# Patient Record
Sex: MALE | Race: WHITE | NOT HISPANIC OR LATINO
[De-identification: names, ages, dates, MRNs, and addresses within clinical notes are randomized per-mention and may not be internally consistent; named-entity substitution may affect disease eponyms.]

---

## 2018-01-08 ENCOUNTER — APPOINTMENT (OUTPATIENT)
Dept: FAMILY MEDICINE | Facility: CLINIC | Age: 78
End: 2018-01-08
Payer: MEDICARE

## 2018-01-08 ENCOUNTER — OTHER (OUTPATIENT)
Age: 78
End: 2018-01-08

## 2018-01-08 ENCOUNTER — APPOINTMENT (OUTPATIENT)
Dept: FAMILY MEDICINE | Facility: CLINIC | Age: 78
End: 2018-01-08

## 2018-01-08 VITALS
OXYGEN SATURATION: 95 % | BODY MASS INDEX: 28.49 KG/M2 | HEART RATE: 87 BPM | TEMPERATURE: 97.5 F | WEIGHT: 188 LBS | SYSTOLIC BLOOD PRESSURE: 140 MMHG | DIASTOLIC BLOOD PRESSURE: 70 MMHG | HEIGHT: 68 IN

## 2018-01-08 PROCEDURE — 99214 OFFICE O/P EST MOD 30 MIN: CPT

## 2018-03-02 LAB — LDLC SERPL CALC-MCNC: 102

## 2018-05-14 ENCOUNTER — APPOINTMENT (OUTPATIENT)
Dept: FAMILY MEDICINE | Facility: CLINIC | Age: 78
End: 2018-05-14
Payer: MEDICARE

## 2018-05-14 VITALS
HEIGHT: 68 IN | DIASTOLIC BLOOD PRESSURE: 70 MMHG | SYSTOLIC BLOOD PRESSURE: 110 MMHG | WEIGHT: 192 LBS | HEART RATE: 72 BPM | BODY MASS INDEX: 29.1 KG/M2

## 2018-05-14 DIAGNOSIS — J30.9 ALLERGIC RHINITIS, UNSPECIFIED: ICD-10-CM

## 2018-05-14 DIAGNOSIS — J06.9 ACUTE UPPER RESPIRATORY INFECTION, UNSPECIFIED: ICD-10-CM

## 2018-05-14 PROCEDURE — 99213 OFFICE O/P EST LOW 20 MIN: CPT

## 2018-06-28 ENCOUNTER — APPOINTMENT (OUTPATIENT)
Dept: FAMILY MEDICINE | Facility: CLINIC | Age: 78
End: 2018-06-28
Payer: MEDICARE

## 2018-06-28 VITALS
DIASTOLIC BLOOD PRESSURE: 50 MMHG | HEIGHT: 68 IN | BODY MASS INDEX: 28.34 KG/M2 | HEART RATE: 71 BPM | WEIGHT: 187 LBS | SYSTOLIC BLOOD PRESSURE: 90 MMHG

## 2018-06-28 VITALS — DIASTOLIC BLOOD PRESSURE: 50 MMHG | SYSTOLIC BLOOD PRESSURE: 96 MMHG

## 2018-06-28 PROCEDURE — 36415 COLL VENOUS BLD VENIPUNCTURE: CPT | Mod: 59

## 2018-06-28 PROCEDURE — G0439: CPT

## 2018-06-28 NOTE — HEALTH RISK ASSESSMENT
[Fair] : ~his/her~ current health as fair  [Good] : ~his/her~  mood as  good [No falls in past year] : Patient reported no falls in the past year [0] : 2) Feeling down, depressed, or hopeless: Not at all (0) [Patient reported colonoscopy was normal] : Patient reported colonoscopy was normal [HIV test declined] : HIV test declined [Hepatitis C test declined] : Hepatitis C test declined [None] : None [With Significant Other] : lives with significant other [Retired] : retired [High School] : high school [] :  [# Of Children ___] : has [unfilled] children [Feels Safe at Home] : Feels safe at home [Fully functional (bathing, dressing, toileting, transferring, walking, feeding)] : Fully functional (bathing, dressing, toileting, transferring, walking, feeding) [Fully functional (using the telephone, shopping, preparing meals, housekeeping, doing laundry, using] : Fully functional and needs no help or supervision to perform IADLs (using the telephone, shopping, preparing meals, housekeeping, doing laundry, using transportation, managing medications and managing finances) [Reports changes in hearing] : Reports changes in hearing [Reports changes in vision] : Reports changes in vision [Smoke Detector] : smoke detector [Carbon Monoxide Detector] : carbon monoxide detector [Safety elements used in home] : safety elements used in home [Seat Belt] :  uses seat belt [Discussed at today's visit] : Advance Directives Discussed at today's visit [Designated Healthcare Proxy] : Designated healthcare proxy [Name: ___] : Health Care Proxy's Name: [unfilled]  [Relationship: ___] : Relationship: [unfilled] [] : No [[id number]] : 0 [Change in mental status noted] : No change in mental status noted [Language] : denies difficulty with language [Learning/Retaining New Information] : denies difficulty learning/retaining new information [Handling Complex Tasks] : denies difficulty handling complex tasks [High Risk Behavior] : no high risk behavior [Reports changes in dental health] : Reports no changes in dental health [Sunscreen] : does not use sunscreen [ColonoscopyDate] : 10/13 [de-identified] : [facility] [de-identified] : GLASSES FOR DISTANCE AND READING [de-identified] : SEES DENTIST ROUTINELY [FreeTextEntry4] : SECONDARY: MADDIE TORO - /

## 2018-06-28 NOTE — PHYSICAL EXAM
[No Acute Distress] : no acute distress [Well Nourished] : well nourished [Well-Appearing] : well-appearing [Normal Sclera/Conjunctiva] : normal sclera/conjunctiva [PERRL] : pupils equal round and reactive to light [EOMI] : extraocular movements intact [Normal Outer Ear/Nose] : the outer ears and nose were normal in appearance [Normal Oropharynx] : the oropharynx was normal [Normal TMs] : both tympanic membranes were normal [Supple] : supple [No Lymphadenopathy] : no lymphadenopathy [No Respiratory Distress] : no respiratory distress  [Clear to Auscultation] : lungs were clear to auscultation bilaterally [No Accessory Muscle Use] : no accessory muscle use [Normal Rate] : normal rate  [Regular Rhythm] : with a regular rhythm [Normal S1, S2] : normal S1 and S2 [Soft] : abdomen soft [Non Tender] : non-tender [Normal Bowel Sounds] : normal bowel sounds [No CVA Tenderness] : no CVA  tenderness [No Joint Swelling] : no joint swelling [Grossly Normal Strength/Tone] : grossly normal strength/tone [No Rash] : no rash [Normal Gait] : normal gait [Coordination Grossly Intact] : coordination grossly intact [No Focal Deficits] : no focal deficits [Speech Grossly Normal] : speech grossly normal [Normal Affect] : the affect was normal [Normal Insight/Judgement] : insight and judgment were intact [Acne] : no acne

## 2018-06-28 NOTE — REVIEW OF SYSTEMS
[Fatigue] : fatigue [Fever] : no fever [Chills] : no chills [Discharge] : no discharge [Pain] : no pain [Earache] : no earache [Nasal Discharge] : no nasal discharge [Sore Throat] : no sore throat [Chest Pain] : no chest pain [Palpitations] : no palpitations [Lower Ext Edema] : no lower extremity edema [Shortness Of Breath] : no shortness of breath [Cough] : no cough [Abdominal Pain] : no abdominal pain [Nausea] : no nausea [Diarrhea] : no diarrhea [Vomiting] : no vomiting [Melena] : no melena [Dysuria] : no dysuria [Hematuria] : no hematuria [Joint Pain] : no joint pain [Back Pain] : no back pain [Itching] : no itching [Skin Rash] : no skin rash [Headache] : no headache [Dizziness] : no dizziness [Fainting] : no fainting [Suicidal] : not suicidal [Depression] : no depression [Easy Bleeding] : no easy bleeding [Easy Bruising] : no easy bruising

## 2018-06-28 NOTE — HISTORY OF PRESENT ILLNESS
[FreeTextEntry1] : WELLNESS EXAM [de-identified] : PRESENTING FOR YEARLY WELLNESS EXAM.  SAW CARDIOLOGY DR. ODEN LAST WEEK.  HAD EKG.  NO MEDICATIONS CHANGED.  SEES EYE DOCTOR ROUTINELY - DR. DAILEY.  DUE TO SEE DERMATOLOGY IN FOLLOW-UP.  DIET IS "PRETTY GOOD".  EXERCISING - CLASSES A COUPLE TIMES A WEEK.

## 2018-06-28 NOTE — PLAN
[FreeTextEntry1] : [facility] WITH [facility]\par VISION SCREENING\par CHECK ROUTINE LAB WORK\par HEALTHY DIET AND LIFESTYLE MODIFICATIONS\par HEALTHY WEIGHT LOSS\par EKG DECLINED: DONE LAST WEEK WITH CARDIOLOGY\par CALL CARDIO NOW IN REGARDS TO BP READINGS - WAS SEEN LAST WEEK TO DISCUSS POTENTIAL MEDICATION ADJUSTMENT\par DUE FOR CT CHEST - COMPLIANCE ADVISED\par FOLLOW-UP ALL SPECIALISTS AS DIRECTED \par CALL WITH ANY QUESTIONS, CONCERNS OR CHANGES

## 2018-06-29 ENCOUNTER — RESULT REVIEW (OUTPATIENT)
Age: 78
End: 2018-06-29

## 2018-06-29 LAB
ALBUMIN SERPL ELPH-MCNC: 4.2 G/DL
ALP BLD-CCNC: 69 U/L
ALT SERPL-CCNC: 18 U/L
ANION GAP SERPL CALC-SCNC: 14 MMOL/L
APPEARANCE: CLEAR
AST SERPL-CCNC: 21 U/L
BASOPHILS # BLD AUTO: 0.02 K/UL
BASOPHILS NFR BLD AUTO: 0.3 %
BILIRUB SERPL-MCNC: 0.6 MG/DL
BILIRUBIN URINE: NEGATIVE
BLOOD URINE: NEGATIVE
BUN SERPL-MCNC: 18 MG/DL
CALCIUM SERPL-MCNC: 9.3 MG/DL
CHLORIDE SERPL-SCNC: 102 MMOL/L
CHOLEST SERPL-MCNC: 127 MG/DL
CHOLEST/HDLC SERPL: 3.1 RATIO
CO2 SERPL-SCNC: 26 MMOL/L
COLOR: YELLOW
CREAT SERPL-MCNC: 0.95 MG/DL
EOSINOPHIL # BLD AUTO: 0.1 K/UL
EOSINOPHIL NFR BLD AUTO: 1.5 %
GLUCOSE QUALITATIVE U: NEGATIVE MG/DL
GLUCOSE SERPL-MCNC: 126 MG/DL
HBA1C MFR BLD HPLC: 6.3 %
HCT VFR BLD CALC: 41.8 %
HDLC SERPL-MCNC: 41 MG/DL
HGB BLD-MCNC: 13 G/DL
IMM GRANULOCYTES NFR BLD AUTO: 0.3 %
KETONES URINE: NEGATIVE
LDLC SERPL CALC-MCNC: 67 MG/DL
LEUKOCYTE ESTERASE URINE: NEGATIVE
LYMPHOCYTES # BLD AUTO: 0.92 K/UL
LYMPHOCYTES NFR BLD AUTO: 14 %
MAN DIFF?: NORMAL
MCHC RBC-ENTMCNC: 31.1 GM/DL
MCHC RBC-ENTMCNC: 32 PG
MCV RBC AUTO: 103 FL
MONOCYTES # BLD AUTO: 0.78 K/UL
MONOCYTES NFR BLD AUTO: 11.9 %
NEUTROPHILS # BLD AUTO: 4.74 K/UL
NEUTROPHILS NFR BLD AUTO: 72 %
NITRITE URINE: NEGATIVE
PH URINE: 5
PLATELET # BLD AUTO: 226 K/UL
POTASSIUM SERPL-SCNC: 4.1 MMOL/L
PROT SERPL-MCNC: 6.6 G/DL
PROTEIN URINE: NEGATIVE MG/DL
RBC # BLD: 4.06 M/UL
RBC # FLD: 14.5 %
SODIUM SERPL-SCNC: 142 MMOL/L
SPECIFIC GRAVITY URINE: 1.02
T4 FREE SERPL-MCNC: 1.4 NG/DL
TRIGL SERPL-MCNC: 95 MG/DL
TSH SERPL-ACNC: 2.22 UIU/ML
UROBILINOGEN URINE: 1 MG/DL
WBC # FLD AUTO: 6.58 K/UL

## 2019-07-09 ENCOUNTER — APPOINTMENT (OUTPATIENT)
Dept: FAMILY MEDICINE | Facility: CLINIC | Age: 79
End: 2019-07-09
Payer: MEDICARE

## 2019-07-09 VITALS
WEIGHT: 188 LBS | SYSTOLIC BLOOD PRESSURE: 90 MMHG | HEART RATE: 70 BPM | HEIGHT: 68 IN | BODY MASS INDEX: 28.49 KG/M2 | DIASTOLIC BLOOD PRESSURE: 58 MMHG

## 2019-07-09 PROCEDURE — G0444 DEPRESSION SCREEN ANNUAL: CPT | Mod: 59

## 2019-07-09 PROCEDURE — G0439: CPT

## 2019-07-09 NOTE — COUNSELING
[Activity counseling provided] : activity [Healthy eating counseling provided] : healthy eating [Low Fat Diet] : Low fat diet [Low Salt Diet] : Low salt diet [None] : None [Walking] : Walking

## 2019-07-09 NOTE — HISTORY OF PRESENT ILLNESS
[FreeTextEntry1] : WELLNESS EXAM [de-identified] : MR. OBWEN IS A PLEASANT 77 YO PRESENTING FOR ANNUAL WELLNESS EXAM.  CHRONIC HISTORY OF HYPERTENSION, HYPERLIPIDEMIA, ISCHEMIC CARDIOMYOPATHY AND CHF.  THE END OF THE MONTH TO HAVE SURGICAL PROCEDURE FOR CARDIOLOGIST TO "MONITOR FLUID LEVELS" AT [facility].  IS PART OF A CLINICAL STUDY.  TORSEMIDE WAS INCREASED TO 60 MG DAILY DUE TO SOB.  NEVER FOLLOWED UP WITH PULMONOLOGY AS DIRECTED.  \par CARDIOLOGY: DR. ODEN\par OPHTHALMOLOGY: DR. DAILEY - SEEN Q6 MONTHS\par DERMATOLOGY: DUE FOR YEARLY SCREENING

## 2019-07-09 NOTE — PLAN
[FreeTextEntry1] : NON-COMPLIANT TO PULMONOLOGY FOLLOW-UP AND IMAGING FOLLOW-UP; CHECK CT CHEST\par TO CHECK ON DATE OF LAST TDAP\par CONSIDER SHINGRIX\par HEALTHY DIET AND LIFESTYLE MODIFICATIONS\par EKG DECLINED\par VISION SCREENING\par [facility]\par CHECK ROUTINE LAB WORK\par FOLLOW-UP ALL SPECIALISTS AS DIRECTED \par CALL WITH ANY QUESTIONS, CONCERNS OR CHANGES

## 2019-07-09 NOTE — REVIEW OF SYSTEMS
[Dyspnea on Exertion] : dyspnea on exertion [Negative] : Heme/Lymph [Shortness Of Breath] : no shortness of breath [Cough] : no cough [Wheezing] : no wheezing [FreeTextEntry6] : CASEY IMPROVED WITH INCREASED DIURETICS FROM CARDIOLOGY

## 2019-07-09 NOTE — PHYSICAL EXAM
[No Rash] : no rash [Normal] : normal gait, coordination grossly intact, no focal deficits and deep tendon reflexes were 2+ and symmetric

## 2019-07-09 NOTE — HEALTH RISK ASSESSMENT
[Fully functional (bathing, dressing, toileting, transferring, walking, feeding)] : Fully functional (bathing, dressing, toileting, transferring, walking, feeding) [Fully functional (using the telephone, shopping, preparing meals, housekeeping, doing laundry, using] : Fully functional and needs no help or supervision to perform IADLs (using the telephone, shopping, preparing meals, housekeeping, doing laundry, using transportation, managing medications and managing finances) [Smoke Detector] : smoke detector [Reports normal functional visual acuity (ie: able to read med bottle)] : Reports normal functional visual acuity [Safety elements used in home] : safety elements used in home [Carbon Monoxide Detector] : carbon monoxide detector [Sunscreen] : uses sunscreen [Seat Belt] :  uses seat belt [With Patient/Caregiver] : With Patient/Caregiver [Good] : ~his/her~  mood as  good [No] : In the past 12 months have you used drugs other than those required for medical reasons? No [No falls in past year] : Patient reported no falls in the past year [0] : 2) Feeling down, depressed, or hopeless: Not at all (0) [Patient reported colonoscopy was normal] : Patient reported colonoscopy was normal [HIV test declined] : HIV test declined [Hepatitis C test declined] : Hepatitis C test declined [None] : None [With Significant Other] : lives with significant other [# of Members in Household ___] :  household currently consist of [unfilled] member(s) [Retired] : retired [] :  [High School] : high school [# Of Children ___] : has [unfilled] children [Feels Safe at Home] : Feels safe at home [] : No [de-identified] : DIET IS PRETTY GOOD.  AVOIDING SODIUM [de-identified] : GOING TO EXERCISE CLASS - TWICE A WEEK [[id number]] : 0 [Learning/Retaining New Information] : denies difficulty learning/retaining new information [Change in mental status noted] : No change in mental status noted [Language] : denies difficulty with language [Reports changes in hearing] : Reports no changes in hearing [High Risk Behavior] : no high risk behavior [Handling Complex Tasks] : denies difficulty handling complex tasks [Reports changes in dental health] : Reports no changes in dental health [Reports changes in vision] : Reports no changes in vision [ColonoscopyDate] : 10/13 [de-identified] : GLASSES FOR DISTANCE AND HEARING.  SEES OPHTHALMOLOGY Q6 MONTHS [FreeTextEntry4] : HEALTH CARE PROXY:\par PRIMARY: KEISHA BOWEN - WIFE\par SECONDARY: MADDIE TORO - FRIEND [AdvancecareDate] : 7/19

## 2019-07-19 LAB
ALBUMIN SERPL ELPH-MCNC: 4.5 G/DL
ALP BLD-CCNC: 84 U/L
ALT SERPL-CCNC: 12 U/L
ANION GAP SERPL CALC-SCNC: 10 MMOL/L
APPEARANCE: CLEAR
AST SERPL-CCNC: 15 U/L
BASOPHILS # BLD AUTO: 0.06 K/UL
BASOPHILS NFR BLD AUTO: 1 %
BILIRUB SERPL-MCNC: 0.8 MG/DL
BILIRUBIN URINE: NEGATIVE
BLOOD URINE: NEGATIVE
BUN SERPL-MCNC: 25 MG/DL
CALCIUM SERPL-MCNC: 9.3 MG/DL
CHLORIDE SERPL-SCNC: 106 MMOL/L
CHOLEST SERPL-MCNC: 102 MG/DL
CHOLEST/HDLC SERPL: 2.6 RATIO
CO2 SERPL-SCNC: 27 MMOL/L
COLOR: COLORLESS
CREAT SERPL-MCNC: 1.14 MG/DL
EOSINOPHIL # BLD AUTO: 0.29 K/UL
EOSINOPHIL NFR BLD AUTO: 4.7 %
ESTIMATED AVERAGE GLUCOSE: 140 MG/DL
GLUCOSE QUALITATIVE U: NEGATIVE
GLUCOSE SERPL-MCNC: 138 MG/DL
HBA1C MFR BLD HPLC: 6.5 %
HCT VFR BLD CALC: 41.1 %
HDLC SERPL-MCNC: 40 MG/DL
HGB BLD-MCNC: 13.1 G/DL
IMM GRANULOCYTES NFR BLD AUTO: 0.3 %
KETONES URINE: NEGATIVE
LDLC SERPL CALC-MCNC: 54 MG/DL
LEUKOCYTE ESTERASE URINE: NEGATIVE
LYMPHOCYTES # BLD AUTO: 1 K/UL
LYMPHOCYTES NFR BLD AUTO: 16.2 %
MAN DIFF?: NORMAL
MCHC RBC-ENTMCNC: 31.9 GM/DL
MCHC RBC-ENTMCNC: 33.2 PG
MCV RBC AUTO: 104.3 FL
MONOCYTES # BLD AUTO: 0.63 K/UL
MONOCYTES NFR BLD AUTO: 10.2 %
NEUTROPHILS # BLD AUTO: 4.19 K/UL
NEUTROPHILS NFR BLD AUTO: 67.6 %
NITRITE URINE: NEGATIVE
PH URINE: 6
PLATELET # BLD AUTO: 219 K/UL
POTASSIUM SERPL-SCNC: 3.9 MMOL/L
PROT SERPL-MCNC: 6.5 G/DL
PROTEIN URINE: NEGATIVE
RBC # BLD: 3.94 M/UL
RBC # FLD: 13.5 %
SODIUM SERPL-SCNC: 143 MMOL/L
SPECIFIC GRAVITY URINE: 1.01
T4 FREE SERPL-MCNC: 1.3 NG/DL
TRIGL SERPL-MCNC: 41 MG/DL
TSH SERPL-ACNC: 3.78 UIU/ML
UROBILINOGEN URINE: NORMAL
WBC # FLD AUTO: 6.19 K/UL

## 2019-08-22 ENCOUNTER — APPOINTMENT (OUTPATIENT)
Dept: FAMILY MEDICINE | Facility: CLINIC | Age: 79
End: 2019-08-22
Payer: MEDICARE

## 2019-08-22 VITALS — SYSTOLIC BLOOD PRESSURE: 70 MMHG | DIASTOLIC BLOOD PRESSURE: 52 MMHG

## 2019-08-22 VITALS
HEIGHT: 68 IN | SYSTOLIC BLOOD PRESSURE: 70 MMHG | DIASTOLIC BLOOD PRESSURE: 50 MMHG | BODY MASS INDEX: 28.04 KG/M2 | HEART RATE: 81 BPM | WEIGHT: 185 LBS

## 2019-08-22 DIAGNOSIS — R42 DIZZINESS AND GIDDINESS: ICD-10-CM

## 2019-08-22 PROCEDURE — 99214 OFFICE O/P EST MOD 30 MIN: CPT

## 2019-08-22 RX ORDER — ASPIRIN 81 MG
81 TABLET, DELAYED RELEASE (ENTERIC COATED) ORAL
Refills: 0 | Status: ACTIVE | COMMUNITY

## 2019-08-22 NOTE — PHYSICAL EXAM
[No Acute Distress] : no acute distress [Well-Appearing] : well-appearing [Well Nourished] : well nourished [No Lymphadenopathy] : no lymphadenopathy [Supple] : supple [No Respiratory Distress] : no respiratory distress  [No Accessory Muscle Use] : no accessory muscle use [Clear to Auscultation] : lungs were clear to auscultation bilaterally [Regular Rhythm] : with a regular rhythm [Normal Rate] : normal rate  [Normal S1, S2] : normal S1 and S2 [No Murmur] : no murmur heard [Pedal Pulses Present] : the pedal pulses are present [No Edema] : there was no peripheral edema [Soft] : abdomen soft [Non Tender] : non-tender [Normal Bowel Sounds] : normal bowel sounds [No Joint Swelling] : no joint swelling [Grossly Normal Strength/Tone] : grossly normal strength/tone [Speech Grossly Normal] : speech grossly normal [Memory Grossly Normal] : memory grossly normal [Alert and Oriented x3] : oriented to person, place, and time [Normal Mood] : the mood was normal

## 2019-08-22 NOTE — PLAN
[FreeTextEntry1] : NEED MOST RECENT CARDIOLOGY CONSULTANT NOTES FOR REVIEW\par MEDICATION LIST REVIEWED AND UPDATED WITH NEW MEDICATIONS\par REVIEWED RECENT LAB WORK; AWARE DIAGNOSIS OF DIABETES\par ROUTINE EYE EXAMS; HEALTHY DIET AND LIFESTYLE MODIFICATIONS, MONITOR HBA1C\par NEED OPHTHALMOLOGY CONSULTANT NOTE FOR REVIEW\par CONCERN FOR SYMPTOMATIC HYPOTENSION.  CALL PLACED TO CARDIOLOGY AND SPOKE WITH DR. BOWERS.  DISCUSSED PATIENT;  ER ADVISED FOR FURTHER EVALUATION AND MONITORING OF BLOOD PRESSURE IN LIEU OF SYMPTOMATIC HYPOTENSION AND SIGNIFICANT CARDIAC HISTORY\par CALL WITH ANY QUESTIONS, CONCERNS OR CHANGES \par AWARE WHEN TO SEEK EMERGENT CARE

## 2019-08-22 NOTE — HISTORY OF PRESENT ILLNESS
[FreeTextEntry1] : F/U CHF [de-identified] : MR. BOWEN IS A PLEASANT 79 YO PRESENTING FOR FOLLOW-UP.  HISTORY OF CHRONIC SYSTOLIC HEART FAILURE, NONISCHEMIC CARDIOMYOPATHY, S/P DEFIBRILLATOR, AFIB S/P OPEN MAZE PROCEDURE AND LEFT ATRIAL THROMBECTOMY AND APPENDAGE LIGATION.  IS ROUTINELY FOLLOWING UP WITH CARDIOLOGIST, [facility] AND NOW A CHF SPECIALIST.  IS TO SEE CHF SPECIALIST DR. RAMON TOMORROW MORNING.  HAS HAD RECENT CHANGES IN MEDICATIONS TWO WEEKS AGO.  DOSAGE OF COREG INCREASED AS WELL AS DIURETICS.  REPORTS FEELING TIRED AND LIGHTHEADED.  NO CHEST PAIN OR PALPITATIONS. NO EDEMA IN LOWER EXTREMITIES. WEIGHT STABLE AND HAS HAD NO SHORTNESS OF BREATH.  HAS HAD NO HEADACHES, GI OR URINARY COMPLAINTS.  ALSO HERE TO REVIEW LAB WORK.  NO PRIOR HISTORY OF DIABETES.  SEES OPHTHALMOLOGIST YEARLY.  NO OTHER COMPLAINTS TODAY. \par \par CARDIOLOGY - DR. ODEN, DR. MARTINEZ, DR. LUCAS, CONGESTIVE HEART FAILURE SPECIALIST DR. RAMON AT [facility]\par OPTHALMOLOGY - DR. JAMES

## 2019-08-22 NOTE — REVIEW OF SYSTEMS
[Fatigue] : fatigue [Fever] : no fever [Chills] : no chills [Discharge] : no discharge [Pain] : no pain [Chest Pain] : no chest pain [Palpitations] : no palpitations [Lower Ext Edema] : no lower extremity edema [Shortness Of Breath] : no shortness of breath [Wheezing] : no wheezing [Cough] : no cough [Abdominal Pain] : no abdominal pain [Nausea] : no nausea [Diarrhea] : no diarrhea [Vomiting] : no vomiting [Dysuria] : no dysuria [Hematuria] : no hematuria [Muscle Pain] : no muscle pain [Dizziness] : no dizziness [Fainting] : no fainting [Confusion] : no confusion [Memory Loss] : no memory loss [Suicidal] : not suicidal [Anxiety] : no anxiety [Depression] : no depression [Easy Bleeding] : no easy bleeding [Easy Bruising] : no easy bruising [de-identified] : LIGHTHEADEDNESS

## 2019-11-17 LAB
ESTIMATED AVERAGE GLUCOSE: 117 MG/DL
HBA1C MFR BLD HPLC: 5.7 %

## 2020-01-09 ENCOUNTER — APPOINTMENT (OUTPATIENT)
Dept: FAMILY MEDICINE | Facility: CLINIC | Age: 80
End: 2020-01-09
Payer: MEDICARE

## 2020-01-09 VITALS
DIASTOLIC BLOOD PRESSURE: 60 MMHG | HEIGHT: 68 IN | BODY MASS INDEX: 28.19 KG/M2 | TEMPERATURE: 97.9 F | WEIGHT: 186 LBS | SYSTOLIC BLOOD PRESSURE: 100 MMHG | HEART RATE: 79 BPM

## 2020-01-09 VITALS — OXYGEN SATURATION: 96 % | HEART RATE: 76 BPM

## 2020-01-09 DIAGNOSIS — J06.9 ACUTE UPPER RESPIRATORY INFECTION, UNSPECIFIED: ICD-10-CM

## 2020-01-09 DIAGNOSIS — Z86.79 PERSONAL HISTORY OF OTHER DISEASES OF THE CIRCULATORY SYSTEM: ICD-10-CM

## 2020-01-09 PROCEDURE — 99214 OFFICE O/P EST MOD 30 MIN: CPT

## 2020-01-11 NOTE — PLAN
[FreeTextEntry1] : SUPPORTIVE CARE: REST, FLUIDS, STEAM\par USE OF HUMIDIFIER\par CONTINUED USE OF NASAL SPRAY\par MUCINEX RECOMMENDED\par TO CALL IF SYMPTOMS WORSEN/PERSIST; LIKELY VIRAL\par RE-ADVISED REPEAT CT CHEST\par CONTACT INFORMATION FOR LOCAL PULMONOLOGY PROVIDERS GIVEN AND RE-ADVISED\par BLOOD PRESSURE CONTROLLED\par CALL WITH ANY QUESTIONS, CONCERNS OR CHANGES\par FOLLOW-UP FOR CPE\par \par

## 2020-01-11 NOTE — REVIEW OF SYSTEMS
[Postnasal Drip] : postnasal drip [Nasal Discharge] : nasal discharge [Cough] : cough [Fever] : no fever [Chills] : no chills [Discharge] : no discharge [Fatigue] : no fatigue [Pain] : no pain [Earache] : no earache [Hearing Loss] : no hearing loss [Sore Throat] : no sore throat [Chest Pain] : no chest pain [Palpitations] : no palpitations [Lower Ext Edema] : no lower extremity edema [Shortness Of Breath] : no shortness of breath [Abdominal Pain] : no abdominal pain [Wheezing] : no wheezing [Diarrhea] : no diarrhea [Nausea] : no nausea [Vomiting] : no vomiting [Muscle Pain] : no muscle pain [Joint Pain] : no joint pain [Muscle Weakness] : no muscle weakness [Headache] : no headache [Dizziness] : no dizziness [Fainting] : no fainting

## 2020-01-11 NOTE — HISTORY OF PRESENT ILLNESS
[FreeTextEntry8] : MR. BOWEN IS A PLEASANT 80 YO PRESENTING FOR ACUTE VISIT.  TODAY WITH COMPLAINT OF A COUGH FOR THE PAST 3 DAYS THAT IS NEITHER WORSENING OR IMPROVING. REPORTS A RUNNY NOSE AND POSTNASAL DRIP. NO SORE THROAT. NO FEVERS. NO CHILLS. DENIES BODY ACHES. HAS HAD NO VOMITING OR DIARRHEA. HAS HAD NO KNOWN SICK CONTACTS.  TAKING NYQUIL WITH SOME RELIEF.  CHRONIC HISTORY OF ISCHEMIC CARDIOMYOPATHY, CHF, HYPERTENSION AND HYPERLIPIDEMIA.  RECENTLY SAW CARDIOLOGY WITH MEDICATION ADJUSTMENTS. BLOOD PRESSURE HAS BEEN CONTROLLED.  HAS NOT SEE PULMONOLOGY OR HAD REPEAT CT CHEST AS PREVIOUSLY DISCUSSED IN FOLLOW-UP OF ABNORMALITIES ON IMAGING.  HAS HAD NO HEADACHES, DIZZINESS, CHEST PAIN, SHORTNESS OF BREATH, GI OR URINARY COMPLAINTS. NO OTHER COMPLAINTS TODAY.

## 2020-01-11 NOTE — PHYSICAL EXAM
[No Acute Distress] : no acute distress [Well Nourished] : well nourished [Well-Appearing] : well-appearing [Normal Outer Ear/Nose] : the outer ears and nose were normal in appearance [Normal Oropharynx] : the oropharynx was normal [Normal TMs] : both tympanic membranes were normal [Supple] : supple [No Lymphadenopathy] : no lymphadenopathy [No Respiratory Distress] : no respiratory distress  [No Accessory Muscle Use] : no accessory muscle use [Normal Rate] : normal rate  [Clear to Auscultation] : lungs were clear to auscultation bilaterally [Normal S1, S2] : normal S1 and S2 [Regular Rhythm] : with a regular rhythm [No Murmur] : no murmur heard [No Joint Swelling] : no joint swelling [Coordination Grossly Intact] : coordination grossly intact [Grossly Normal Strength/Tone] : grossly normal strength/tone [No Focal Deficits] : no focal deficits [Normal Gait] : normal gait

## 2020-01-16 ENCOUNTER — APPOINTMENT (OUTPATIENT)
Dept: PULMONOLOGY | Facility: CLINIC | Age: 80
End: 2020-01-16
Payer: MEDICARE

## 2020-01-16 ENCOUNTER — APPOINTMENT (OUTPATIENT)
Dept: FAMILY MEDICINE | Facility: CLINIC | Age: 80
End: 2020-01-16

## 2020-01-16 VITALS — WEIGHT: 186 LBS | HEIGHT: 66.5 IN | BODY MASS INDEX: 29.54 KG/M2

## 2020-01-16 VITALS — HEART RATE: 80 BPM | DIASTOLIC BLOOD PRESSURE: 76 MMHG | OXYGEN SATURATION: 96 % | SYSTOLIC BLOOD PRESSURE: 118 MMHG

## 2020-01-16 DIAGNOSIS — Z87.891 PERSONAL HISTORY OF NICOTINE DEPENDENCE: ICD-10-CM

## 2020-01-16 DIAGNOSIS — R06.09 OTHER FORMS OF DYSPNEA: ICD-10-CM

## 2020-01-16 DIAGNOSIS — R91.8 OTHER NONSPECIFIC ABNORMAL FINDING OF LUNG FIELD: ICD-10-CM

## 2020-01-16 DIAGNOSIS — J45.909 UNSPECIFIED ASTHMA, UNCOMPLICATED: ICD-10-CM

## 2020-01-16 PROCEDURE — 94060 EVALUATION OF WHEEZING: CPT

## 2020-01-16 PROCEDURE — 99205 OFFICE O/P NEW HI 60 MIN: CPT | Mod: 25

## 2020-01-16 PROCEDURE — 94727 GAS DIL/WSHOT DETER LNG VOL: CPT

## 2020-01-16 PROCEDURE — 85018 HEMOGLOBIN: CPT | Mod: QW

## 2020-01-16 PROCEDURE — 94729 DIFFUSING CAPACITY: CPT

## 2020-01-16 PROCEDURE — 94664 DEMO&/EVAL PT USE INHALER: CPT | Mod: 59

## 2020-04-16 ENCOUNTER — APPOINTMENT (OUTPATIENT)
Dept: PULMONOLOGY | Facility: CLINIC | Age: 80
End: 2020-04-16

## 2020-12-23 PROBLEM — J06.9 ACUTE UPPER RESPIRATORY INFECTION: Status: RESOLVED | Noted: 2020-01-11 | Resolved: 2020-12-23

## 2021-12-15 ENCOUNTER — LABORATORY RESULT (OUTPATIENT)
Age: 81
End: 2021-12-15

## 2021-12-15 ENCOUNTER — NON-APPOINTMENT (OUTPATIENT)
Age: 81
End: 2021-12-15

## 2021-12-15 ENCOUNTER — APPOINTMENT (OUTPATIENT)
Dept: FAMILY MEDICINE | Facility: CLINIC | Age: 81
End: 2021-12-15
Payer: MEDICARE

## 2021-12-15 VITALS
WEIGHT: 190 LBS | DIASTOLIC BLOOD PRESSURE: 68 MMHG | BODY MASS INDEX: 30.17 KG/M2 | SYSTOLIC BLOOD PRESSURE: 102 MMHG | HEART RATE: 70 BPM | HEIGHT: 66.5 IN

## 2021-12-15 DIAGNOSIS — Z00.00 ENCOUNTER FOR GENERAL ADULT MEDICAL EXAMINATION W/OUT ABNORMAL FINDINGS: ICD-10-CM

## 2021-12-15 PROCEDURE — 36415 COLL VENOUS BLD VENIPUNCTURE: CPT

## 2021-12-15 PROCEDURE — G0444 DEPRESSION SCREEN ANNUAL: CPT

## 2021-12-15 PROCEDURE — G0439: CPT

## 2021-12-15 RX ORDER — PREDNISONE 10 MG/1
10 TABLET ORAL
Qty: 25 | Refills: 0 | Status: DISCONTINUED | COMMUNITY
Start: 2020-01-16 | End: 2021-12-15

## 2021-12-15 RX ORDER — DOXYCYCLINE 100 MG/1
100 TABLET, FILM COATED ORAL
Qty: 14 | Refills: 0 | Status: DISCONTINUED | COMMUNITY
Start: 2020-01-16 | End: 2021-12-15

## 2021-12-15 NOTE — PLAN
[FreeTextEntry1] : VISION SCREENING\par SAFETY/HEALTHY HABITS REVIEWED\par HEALTHY DIET AND LIFESTYLE MODIFICATIONS\par HEALTHY WEIGHT LOSS \par CHECK ROUTINE LAB WORK\par VACCINATIONS DISCUSSED: TDAP, SHINGRIX\par FOLLOW-UP ALL SPECIALISTS AS DIRECTED INCLUDING PULMONOLOGY\par NEED UPDATED CARDIOLOGY CONSULTANT NOTES\par CALL WITH ANY QUESTIONS, CONCERNS OR CHANGES

## 2021-12-15 NOTE — PHYSICAL EXAM
[No Acute Distress] : no acute distress [Well Nourished] : well nourished [Well-Appearing] : well-appearing [Normal Sclera/Conjunctiva] : normal sclera/conjunctiva [PERRL] : pupils equal round and reactive to light [Normal Outer Ear/Nose] : the outer ears and nose were normal in appearance [Normal Oropharynx] : the oropharynx was normal [Normal TMs] : both tympanic membranes were normal [No Lymphadenopathy] : no lymphadenopathy [Supple] : supple [No Respiratory Distress] : no respiratory distress  [No Accessory Muscle Use] : no accessory muscle use [Clear to Auscultation] : lungs were clear to auscultation bilaterally [Normal Rate] : normal rate  [Regular Rhythm] : with a regular rhythm [Normal S1, S2] : normal S1 and S2 [No Murmur] : no murmur heard [Soft] : abdomen soft [Non Tender] : non-tender [Normal Bowel Sounds] : normal bowel sounds [No Joint Swelling] : no joint swelling [Grossly Normal Strength/Tone] : grossly normal strength/tone [No Rash] : no rash [Coordination Grossly Intact] : coordination grossly intact [No Focal Deficits] : no focal deficits [Normal Gait] : normal gait [Speech Grossly Normal] : speech grossly normal [Memory Grossly Normal] : memory grossly normal [Normal Mood] : the mood was normal

## 2021-12-15 NOTE — HISTORY OF PRESENT ILLNESS
[FreeTextEntry1] : wellness exam [de-identified] : MR BOWEN IS A PLEASANT 80 YO PRESENTING FOR YEARLY WELLNESS EXAM.   HISTORY OF CHRONIC SYSTOLIC HEART FAILURE, NONISCHEMIC CARDIOMYOPATHY, S/P DEFIBRILLATOR, AFIB S/P OPEN MAZE PROCEDURE AND LEFT ATRIAL THROMBECTOMY AND APPENDAGE LIGATION, HYPERTENSION, HYPERLIPIDEMIA.  FOLLOWS ROUTINELY WITH MULTIPLE CARDIOLOGISTS/SPECIALISTS.  IS FEELING WELL.\par \par OPHTHALMOLOGY: DR. DAILEY

## 2021-12-15 NOTE — HEALTH RISK ASSESSMENT
[Very Good] : ~his/her~  mood as very good [Former] : Former [20 or more] : 20 or more [No] : In the past 12 months have you used drugs other than those required for medical reasons? No [No falls in past year] : Patient reported no falls in the past year [0] : 2) Feeling down, depressed, or hopeless: Not at all (0) [PHQ-2 Negative - No further assessment needed] : PHQ-2 Negative - No further assessment needed [HIV test declined] : HIV test declined [Hepatitis C test declined] : Hepatitis C test declined [None] : None [With Significant Other] : lives with significant other [Retired] : retired [High School] : high school [] :  [# Of Children ___] : has [unfilled] children [Feels Safe at Home] : Feels safe at home [Fully functional (bathing, dressing, toileting, transferring, walking, feeding)] : Fully functional (bathing, dressing, toileting, transferring, walking, feeding) [Fully functional (using the telephone, shopping, preparing meals, housekeeping, doing laundry, using] : Fully functional and needs no help or supervision to perform IADLs (using the telephone, shopping, preparing meals, housekeeping, doing laundry, using transportation, managing medications and managing finances) [Reports normal functional visual acuity (ie: able to read med bottle)] : Reports normal functional visual acuity [Smoke Detector] : smoke detector [Carbon Monoxide Detector] : carbon monoxide detector [Safety elements used in home] : safety elements used in home [Seat Belt] :  uses seat belt [Sunscreen] : uses sunscreen [With Patient/Caregiver] : , with patient/caregiver [Designated Healthcare Proxy] : Designated healthcare proxy [Name: ___] : Health Care Proxy's Name: [unfilled]  [Relationship: ___] : Relationship: [unfilled] [YearQuit] : 1975 [de-identified] : not routinely exercising, remaining active [de-identified] : cheating here and there [[id number]] : 0 [Change in mental status noted] : No change in mental status noted [Language] : denies difficulty with language [Learning/Retaining New Information] : denies difficulty learning/retaining new information [Handling Complex Tasks] : denies difficulty handling complex tasks [Sexually Active] : not sexually active [Reports changes in hearing] : Reports no changes in hearing [Reports changes in vision] : Reports no changes in vision [Reports changes in dental health] : Reports no changes in dental health [ColonoscopyDate] : 10/13 [de-identified] : [facility] [de-identified] : glasses for distance and reading [AdvancecareDate] : 12/21

## 2021-12-16 LAB
ALBUMIN SERPL ELPH-MCNC: 4.6 G/DL
ALP BLD-CCNC: 92 U/L
ALT SERPL-CCNC: 18 U/L
ANION GAP SERPL CALC-SCNC: 13 MMOL/L
APPEARANCE: CLEAR
AST SERPL-CCNC: 20 U/L
BILIRUB SERPL-MCNC: 0.6 MG/DL
BILIRUBIN URINE: NEGATIVE
BLOOD URINE: NEGATIVE
BUN SERPL-MCNC: 29 MG/DL
CALCIUM SERPL-MCNC: 9.1 MG/DL
CHLORIDE SERPL-SCNC: 105 MMOL/L
CHOLEST SERPL-MCNC: 131 MG/DL
CO2 SERPL-SCNC: 23 MMOL/L
COLOR: YELLOW
CREAT SERPL-MCNC: 1.24 MG/DL
ESTIMATED AVERAGE GLUCOSE: 137 MG/DL
GLUCOSE QUALITATIVE U: ABNORMAL
GLUCOSE SERPL-MCNC: 111 MG/DL
HBA1C MFR BLD HPLC: 6.4 %
HDLC SERPL-MCNC: 41 MG/DL
KETONES URINE: NEGATIVE
LDLC SERPL CALC-MCNC: 74 MG/DL
LEUKOCYTE ESTERASE URINE: NEGATIVE
NITRITE URINE: NEGATIVE
NONHDLC SERPL-MCNC: 89 MG/DL
PH URINE: 5.5
POTASSIUM SERPL-SCNC: 4.5 MMOL/L
PROT SERPL-MCNC: 6.9 G/DL
PROTEIN URINE: NEGATIVE
PSA SERPL-MCNC: 1.72 NG/ML
SODIUM SERPL-SCNC: 141 MMOL/L
SPECIFIC GRAVITY URINE: 1.02
T4 FREE SERPL-MCNC: 1.3 NG/DL
TRIGL SERPL-MCNC: 77 MG/DL
TSH SERPL-ACNC: 3.64 UIU/ML
UROBILINOGEN URINE: NORMAL

## 2021-12-23 LAB
BASOPHILS # BLD AUTO: 0.03 K/UL
BASOPHILS NFR BLD AUTO: 0.3 %
EOSINOPHIL # BLD AUTO: 0.25 K/UL
EOSINOPHIL NFR BLD AUTO: 2.8 %
HCT VFR BLD CALC: 45.6 %
HGB BLD-MCNC: 14.3 G/DL
IMM GRANULOCYTES NFR BLD AUTO: 0.3 %
LYMPHOCYTES # BLD AUTO: 0.81 K/UL
LYMPHOCYTES NFR BLD AUTO: 9.1 %
MAN DIFF?: NORMAL
MCHC RBC-ENTMCNC: 31.4 GM/DL
MCHC RBC-ENTMCNC: 34 PG
MCV RBC AUTO: 108.3 FL
MONOCYTES # BLD AUTO: 1.04 K/UL
MONOCYTES NFR BLD AUTO: 11.7 %
NEUTROPHILS # BLD AUTO: 6.76 K/UL
NEUTROPHILS NFR BLD AUTO: 75.8 %
PLATELET # BLD AUTO: 252 K/UL
RBC # BLD: 4.21 M/UL
RBC # FLD: 13.2 %
WBC # FLD AUTO: 8.92 K/UL

## 2022-01-03 LAB — VIT B12 SERPL-MCNC: 1171 PG/ML

## 2022-08-16 ENCOUNTER — LABORATORY RESULT (OUTPATIENT)
Age: 82
End: 2022-08-16

## 2022-08-16 ENCOUNTER — APPOINTMENT (OUTPATIENT)
Dept: FAMILY MEDICINE | Facility: CLINIC | Age: 82
End: 2022-08-16

## 2022-08-16 VITALS
HEART RATE: 57 BPM | TEMPERATURE: 97.4 F | WEIGHT: 189 LBS | HEIGHT: 66.5 IN | OXYGEN SATURATION: 97 % | SYSTOLIC BLOOD PRESSURE: 98 MMHG | BODY MASS INDEX: 30.02 KG/M2 | DIASTOLIC BLOOD PRESSURE: 60 MMHG

## 2022-08-16 DIAGNOSIS — M79.606 PAIN IN LEG, UNSPECIFIED: ICD-10-CM

## 2022-08-16 PROCEDURE — 36415 COLL VENOUS BLD VENIPUNCTURE: CPT

## 2022-08-16 PROCEDURE — 99213 OFFICE O/P EST LOW 20 MIN: CPT | Mod: 25

## 2022-08-17 LAB
ALBUMIN SERPL ELPH-MCNC: 4.4 G/DL
ALP BLD-CCNC: 95 U/L
ALT SERPL-CCNC: 16 U/L
ANION GAP SERPL CALC-SCNC: 11 MMOL/L
AST SERPL-CCNC: 17 U/L
BASOPHILS # BLD AUTO: 0.07 K/UL
BASOPHILS NFR BLD AUTO: 1 %
BILIRUB SERPL-MCNC: 0.7 MG/DL
BUN SERPL-MCNC: 22 MG/DL
CALCIUM SERPL-MCNC: 9.6 MG/DL
CHLORIDE SERPL-SCNC: 106 MMOL/L
CK SERPL-CCNC: 76 U/L
CO2 SERPL-SCNC: 27 MMOL/L
CREAT SERPL-MCNC: 1.07 MG/DL
EGFR: 71 ML/MIN/1.73M2
EOSINOPHIL # BLD AUTO: 0.48 K/UL
EOSINOPHIL NFR BLD AUTO: 6.6 %
GLUCOSE SERPL-MCNC: 80 MG/DL
HCT VFR BLD CALC: 43.3 %
HGB BLD-MCNC: 13.5 G/DL
IMM GRANULOCYTES NFR BLD AUTO: 0.4 %
LYMPHOCYTES # BLD AUTO: 1.27 K/UL
LYMPHOCYTES NFR BLD AUTO: 17.4 %
MAGNESIUM SERPL-MCNC: 2.5 MG/DL
MAN DIFF?: NORMAL
MCHC RBC-ENTMCNC: 31.2 GM/DL
MCHC RBC-ENTMCNC: 33.7 PG
MCV RBC AUTO: 108 FL
MONOCYTES # BLD AUTO: 0.72 K/UL
MONOCYTES NFR BLD AUTO: 9.9 %
NEUTROPHILS # BLD AUTO: 4.73 K/UL
NEUTROPHILS NFR BLD AUTO: 64.7 %
PLATELET # BLD AUTO: 263 K/UL
POTASSIUM SERPL-SCNC: 4.8 MMOL/L
PROT SERPL-MCNC: 6.3 G/DL
RBC # BLD: 4.01 M/UL
RBC # FLD: 14.1 %
SODIUM SERPL-SCNC: 144 MMOL/L
T4 FREE SERPL-MCNC: 1.3 NG/DL
TSH SERPL-ACNC: 2.85 UIU/ML
WBC # FLD AUTO: 7.3 K/UL

## 2022-08-21 PROBLEM — M79.606 LEG PAIN: Status: ACTIVE | Noted: 2022-08-16

## 2022-08-21 NOTE — PHYSICAL EXAM
[No Acute Distress] : no acute distress [Well Nourished] : well nourished [Well-Appearing] : well-appearing [No Respiratory Distress] : no respiratory distress  [No Accessory Muscle Use] : no accessory muscle use [Clear to Auscultation] : lungs were clear to auscultation bilaterally [Normal Rate] : normal rate  [Regular Rhythm] : with a regular rhythm [Normal S1, S2] : normal S1 and S2 [No Joint Swelling] : no joint swelling [Grossly Normal Strength/Tone] : grossly normal strength/tone [Coordination Grossly Intact] : coordination grossly intact [No Focal Deficits] : no focal deficits [Deep Tendon Reflexes (DTR)] : deep tendon reflexes were 2+ and symmetric [de-identified] : VARICOSE VEINS, DISTAL PULSES INTACT,  GOOD ROM.  TRACE NON-PITTING SWELLING LEFT LOWER LEG

## 2022-08-21 NOTE — HISTORY OF PRESENT ILLNESS
[FreeTextEntry8] : MR. BOWEN IS A PLEASANT 82 YO PRESENTING FOR ACUTE VISIT.  HAS HAD 2 WEEKS OF LEFT LEG PAIN.  A LITTLE ON RIGHT.  NO FALLS OR TRAUMA.  NO IMPROVEMENT.  IT IS JUST THE LOWER LEG.  NO PRIOR EPISODE IN THE PAST.  BACK IS OKAY AND HIPS AND KNEES.  NO JOINT PAIN.  DENIES SWELLING.  NO CHEST PAIN OR SHORTNESS OF BREATH.  NO HEADACHE OR DIZZINESS.  IS ON NO NEW MEDICATIONS.  HAS [facility] WORK WITH [facility] AND AT [facility].  LAST WAS TWO MONTHS AGO.  HAD A RECENT ECHOCARDIOGRAM.  HAS VARICOSE VEINS.  NO RECENT VASCULAR EVALUATION.  NO OTHER COMPLAINTS TODAY.

## 2022-08-21 NOTE — PLAN
[FreeTextEntry1] : ADVISED TO CHECK VENOUS DUPLEX\par WILL SEND FOR LAB WORK\par VASCULAR EVALUATION\par CONSIDER NEUROLOGY\par CALL WITH ANY QUESTIONS, CONCERNS OR CHANGES \par FOLLOW-UP FOR CHRONIC CARE

## 2023-01-01 ENCOUNTER — APPOINTMENT (OUTPATIENT)
Dept: FAMILY MEDICINE | Facility: CLINIC | Age: 83
End: 2023-01-01
Payer: MEDICARE

## 2023-01-01 ENCOUNTER — OUTPATIENT (OUTPATIENT)
Dept: OUTPATIENT SERVICES | Facility: HOSPITAL | Age: 83
LOS: 1 days | End: 2023-01-01
Payer: MEDICARE

## 2023-01-01 ENCOUNTER — APPOINTMENT (OUTPATIENT)
Dept: RADIOLOGY | Facility: CLINIC | Age: 83
End: 2023-01-01
Payer: MEDICARE

## 2023-01-01 ENCOUNTER — APPOINTMENT (OUTPATIENT)
Dept: ULTRASOUND IMAGING | Facility: CLINIC | Age: 83
End: 2023-01-01
Payer: MEDICARE

## 2023-01-01 ENCOUNTER — NON-APPOINTMENT (OUTPATIENT)
Age: 83
End: 2023-01-01

## 2023-01-01 ENCOUNTER — APPOINTMENT (OUTPATIENT)
Dept: DERMATOLOGY | Facility: CLINIC | Age: 83
End: 2023-01-01
Payer: MEDICARE

## 2023-01-01 ENCOUNTER — APPOINTMENT (OUTPATIENT)
Dept: CT IMAGING | Facility: CLINIC | Age: 83
End: 2023-01-01
Payer: MEDICARE

## 2023-01-01 ENCOUNTER — EMERGENCY (EMERGENCY)
Facility: HOSPITAL | Age: 83
LOS: 1 days | Discharge: HOME | End: 2023-01-01
Attending: EMERGENCY MEDICINE
Payer: MEDICARE

## 2023-01-01 VITALS
SYSTOLIC BLOOD PRESSURE: 112 MMHG | BODY MASS INDEX: 30.53 KG/M2 | WEIGHT: 190 LBS | DIASTOLIC BLOOD PRESSURE: 64 MMHG | HEART RATE: 72 BPM | OXYGEN SATURATION: 98 % | TEMPERATURE: 98.4 F | HEIGHT: 66 IN

## 2023-01-01 VITALS
DIASTOLIC BLOOD PRESSURE: 52 MMHG | SYSTOLIC BLOOD PRESSURE: 90 MMHG | WEIGHT: 178 LBS | BODY MASS INDEX: 28.61 KG/M2 | HEART RATE: 79 BPM | HEIGHT: 66 IN

## 2023-01-01 VITALS
SYSTOLIC BLOOD PRESSURE: 114 MMHG | HEIGHT: 66.5 IN | TEMPERATURE: 97.4 F | BODY MASS INDEX: 30.17 KG/M2 | HEART RATE: 65 BPM | WEIGHT: 190 LBS | OXYGEN SATURATION: 100 % | DIASTOLIC BLOOD PRESSURE: 68 MMHG

## 2023-01-01 VITALS
DIASTOLIC BLOOD PRESSURE: 65 MMHG | HEART RATE: 100 BPM | TEMPERATURE: 97 F | OXYGEN SATURATION: 90 % | HEIGHT: 68 IN | WEIGHT: 175.05 LBS | SYSTOLIC BLOOD PRESSURE: 102 MMHG | RESPIRATION RATE: 20 BRPM

## 2023-01-01 VITALS
WEIGHT: 191 LBS | TEMPERATURE: 97.7 F | HEIGHT: 66.5 IN | BODY MASS INDEX: 30.34 KG/M2 | DIASTOLIC BLOOD PRESSURE: 60 MMHG | SYSTOLIC BLOOD PRESSURE: 102 MMHG | OXYGEN SATURATION: 97 % | HEART RATE: 71 BPM

## 2023-01-01 VITALS
WEIGHT: 179 LBS | DIASTOLIC BLOOD PRESSURE: 48 MMHG | SYSTOLIC BLOOD PRESSURE: 96 MMHG | BODY MASS INDEX: 27.13 KG/M2 | HEART RATE: 71 BPM | OXYGEN SATURATION: 98 % | HEIGHT: 68 IN

## 2023-01-01 VITALS
HEIGHT: 68 IN | SYSTOLIC BLOOD PRESSURE: 106 MMHG | BODY MASS INDEX: 26.37 KG/M2 | WEIGHT: 174 LBS | OXYGEN SATURATION: 97 % | HEART RATE: 79 BPM | DIASTOLIC BLOOD PRESSURE: 42 MMHG

## 2023-01-01 VITALS
HEIGHT: 68 IN | WEIGHT: 169 LBS | OXYGEN SATURATION: 94 % | DIASTOLIC BLOOD PRESSURE: 58 MMHG | HEART RATE: 66 BPM | BODY MASS INDEX: 25.61 KG/M2 | SYSTOLIC BLOOD PRESSURE: 102 MMHG

## 2023-01-01 VITALS
RESPIRATION RATE: 18 BRPM | HEART RATE: 94 BPM | OXYGEN SATURATION: 90 % | SYSTOLIC BLOOD PRESSURE: 95 MMHG | DIASTOLIC BLOOD PRESSURE: 64 MMHG | TEMPERATURE: 98 F

## 2023-01-01 VITALS — DIASTOLIC BLOOD PRESSURE: 58 MMHG | SYSTOLIC BLOOD PRESSURE: 118 MMHG

## 2023-01-01 DIAGNOSIS — R05.9 COUGH, UNSPECIFIED: ICD-10-CM

## 2023-01-01 DIAGNOSIS — Z95.810 PRESENCE OF AUTOMATIC (IMPLANTABLE) CARDIAC DEFIBRILLATOR: Chronic | ICD-10-CM

## 2023-01-01 DIAGNOSIS — M79.645 PAIN IN LEFT FINGER(S): ICD-10-CM

## 2023-01-01 DIAGNOSIS — Z23 ENCOUNTER FOR IMMUNIZATION: ICD-10-CM

## 2023-01-01 DIAGNOSIS — J84.10 PULMONARY FIBROSIS, UNSPECIFIED: ICD-10-CM

## 2023-01-01 DIAGNOSIS — M79.89 OTHER SPECIFIED SOFT TISSUE DISORDERS: ICD-10-CM

## 2023-01-01 DIAGNOSIS — E11.9 TYPE 2 DIABETES MELLITUS W/OUT COMPLICATIONS: ICD-10-CM

## 2023-01-01 DIAGNOSIS — Z98.89 OTHER SPECIFIED POSTPROCEDURAL STATES: Chronic | ICD-10-CM

## 2023-01-01 DIAGNOSIS — I10 ESSENTIAL (PRIMARY) HYPERTENSION: ICD-10-CM

## 2023-01-01 DIAGNOSIS — E07.9 DISORDER OF THYROID, UNSPECIFIED: ICD-10-CM

## 2023-01-01 DIAGNOSIS — R06.00 DYSPNEA, UNSPECIFIED: ICD-10-CM
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
## 2023-01-01 DIAGNOSIS — I25.10 ATHEROSCLEROTIC HEART DISEASE OF NATIVE CORONARY ARTERY W/OUT ANGINA PECTORIS: ICD-10-CM

## 2023-01-01 DIAGNOSIS — I25.5 ISCHEMIC CARDIOMYOPATHY: ICD-10-CM

## 2023-01-01 DIAGNOSIS — R53.83 OTHER FATIGUE: ICD-10-CM

## 2023-01-01 DIAGNOSIS — I42.9 CARDIOMYOPATHY, UNSPECIFIED: ICD-10-CM

## 2023-01-01 DIAGNOSIS — E78.5 HYPERLIPIDEMIA, UNSPECIFIED: ICD-10-CM

## 2023-01-01 DIAGNOSIS — R91.8 OTHER NONSPECIFIC ABNORMAL FINDING OF LUNG FIELD: ICD-10-CM

## 2023-01-01 DIAGNOSIS — I50.9 HEART FAILURE, UNSPECIFIED: ICD-10-CM

## 2023-01-01 DIAGNOSIS — U07.1 COVID-19: ICD-10-CM

## 2023-01-01 DIAGNOSIS — R74.8 ABNORMAL LEVELS OF OTHER SERUM ENZYMES: ICD-10-CM

## 2023-01-01 LAB
INFLUENZA A RESULT: NOT DETECTED
INFLUENZA A RESULT: NOT DETECTED
INFLUENZA B RESULT: NOT DETECTED
INFLUENZA B RESULT: NOT DETECTED
RESP SYN VIRUS RESULT: NOT DETECTED
RESP SYN VIRUS RESULT: NOT DETECTED
SARS-COV-2 RESULT: DETECTED
SARS-COV-2 RESULT: NOT DETECTED

## 2023-01-01 PROCEDURE — 93970 EXTREMITY STUDY: CPT | Mod: 26

## 2023-01-01 PROCEDURE — 12052 INTMD RPR FACE/MM 2.6-5.0 CM: CPT | Mod: XU

## 2023-01-01 PROCEDURE — 40650 RPR LIP FTH VERMILION ONLY: CPT

## 2023-01-01 PROCEDURE — 70450 CT HEAD/BRAIN W/O DYE: CPT | Mod: 26,MG

## 2023-01-01 PROCEDURE — 73110 X-RAY EXAM OF WRIST: CPT | Mod: 26,LT

## 2023-01-01 PROCEDURE — 73110 X-RAY EXAM OF WRIST: CPT

## 2023-01-01 PROCEDURE — 90662 IIV NO PRSV INCREASED AG IM: CPT

## 2023-01-01 PROCEDURE — 99214 OFFICE O/P EST MOD 30 MIN: CPT

## 2023-01-01 PROCEDURE — G1004: CPT

## 2023-01-01 PROCEDURE — 71275 CT ANGIOGRAPHY CHEST: CPT | Mod: 26,MH

## 2023-01-01 PROCEDURE — G0008: CPT

## 2023-01-01 PROCEDURE — 99213 OFFICE O/P EST LOW 20 MIN: CPT

## 2023-01-01 PROCEDURE — 99285 EMERGENCY DEPT VISIT HI MDM: CPT | Mod: 25,GC

## 2023-01-01 PROCEDURE — 73130 X-RAY EXAM OF HAND: CPT | Mod: 26,LT

## 2023-01-01 PROCEDURE — 12013 RPR F/E/E/N/L/M 2.6-5.0 CM: CPT

## 2023-01-01 PROCEDURE — 99214 OFFICE O/P EST MOD 30 MIN: CPT | Mod: 25

## 2023-01-01 PROCEDURE — 99204 OFFICE O/P NEW MOD 45 MIN: CPT

## 2023-01-01 PROCEDURE — 93970 EXTREMITY STUDY: CPT

## 2023-01-01 PROCEDURE — 99441: CPT | Mod: CS,95

## 2023-01-01 PROCEDURE — 90715 TDAP VACCINE 7 YRS/> IM: CPT

## 2023-01-01 PROCEDURE — 70450 CT HEAD/BRAIN W/O DYE: CPT | Mod: MG

## 2023-01-01 PROCEDURE — 73130 X-RAY EXAM OF HAND: CPT | Mod: 26,RT

## 2023-01-01 PROCEDURE — 73130 X-RAY EXAM OF HAND: CPT

## 2023-01-01 PROCEDURE — 90471 IMMUNIZATION ADMIN: CPT

## 2023-01-01 PROCEDURE — 71046 X-RAY EXAM CHEST 2 VIEWS: CPT | Mod: 26

## 2023-01-01 PROCEDURE — 99215 OFFICE O/P EST HI 40 MIN: CPT

## 2023-01-01 PROCEDURE — 72125 CT NECK SPINE W/O DYE: CPT | Mod: 26,MG

## 2023-01-01 PROCEDURE — 71275 CT ANGIOGRAPHY CHEST: CPT

## 2023-01-01 PROCEDURE — 72125 CT NECK SPINE W/O DYE: CPT | Mod: MG

## 2023-01-01 PROCEDURE — 99284 EMERGENCY DEPT VISIT MOD MDM: CPT | Mod: 25

## 2023-01-01 PROCEDURE — 71046 X-RAY EXAM CHEST 2 VIEWS: CPT

## 2023-01-01 RX ORDER — FLUTICASONE FUROATE AND VILANTEROL TRIFENATATE 100; 25 UG/1; UG/1
100-25 POWDER RESPIRATORY (INHALATION) DAILY
Qty: 1 | Refills: 1 | Status: DISCONTINUED | COMMUNITY
Start: 2020-01-16 | End: 2023-01-01

## 2023-01-01 RX ORDER — POTASSIUM CHLORIDE 1.5 G/1.58G
20 POWDER, FOR SOLUTION ORAL DAILY
Refills: 0 | Status: ACTIVE | COMMUNITY

## 2023-01-01 RX ORDER — ALBUTEROL SULFATE 90 UG/1
108 (90 BASE) INHALANT RESPIRATORY (INHALATION)
Qty: 1 | Refills: 0 | Status: DISCONTINUED | COMMUNITY
Start: 2023-01-01 | End: 2023-01-01

## 2023-01-01 RX ORDER — CHROMIUM 200 MCG
25 MCG TABLET ORAL DAILY
Qty: 90 | Refills: 0 | Status: ACTIVE | COMMUNITY

## 2023-01-01 RX ORDER — ACETAMINOPHEN 500 MG
975 TABLET ORAL ONCE
Refills: 0 | Status: COMPLETED | OUTPATIENT
Start: 2023-01-01 | End: 2023-01-01

## 2023-01-01 RX ORDER — FLUTICASONE PROPIONATE 50 UG/1
50 SPRAY, METERED NASAL DAILY
Qty: 1 | Refills: 0 | Status: DISCONTINUED | COMMUNITY
Start: 2018-01-08 | End: 2023-01-01

## 2023-01-01 RX ORDER — SITAGLIPTIN AND METFORMIN HYDROCHLORIDE 50; 1000 MG/1; MG/1
50-1000 TABLET, FILM COATED ORAL TWICE DAILY
Qty: 180 | Refills: 0 | Status: ACTIVE | COMMUNITY
Start: 1900-01-01 | End: 1900-01-01

## 2023-01-01 RX ORDER — LEVOTHYROXINE SODIUM 0.05 MG/1
50 TABLET ORAL DAILY
Qty: 1 | Refills: 0 | Status: ACTIVE | COMMUNITY

## 2023-01-01 RX ORDER — ACETAMINOPHEN/DIPHENHYDRAMINE 500MG-25MG
1000 TABLET ORAL DAILY
Refills: 0 | Status: ACTIVE | COMMUNITY

## 2023-01-01 RX ORDER — SACUBITRIL AND VALSARTAN 49; 51 MG/1; MG/1
49-51 TABLET, FILM COATED ORAL TWICE DAILY
Refills: 0 | Status: DISCONTINUED | COMMUNITY
End: 2023-01-01

## 2023-01-01 RX ORDER — BUMETANIDE 1 MG/1
1 TABLET ORAL
Refills: 0 | Status: ACTIVE | COMMUNITY

## 2023-01-01 RX ORDER — SPIRONOLACTONE 25 MG/1
25 TABLET ORAL DAILY
Qty: 90 | Refills: 1 | Status: ACTIVE | COMMUNITY

## 2023-01-01 RX ORDER — DAPAGLIFLOZIN 10 MG/1
10 TABLET, FILM COATED ORAL
Qty: 90 | Refills: 0 | Status: ACTIVE | COMMUNITY

## 2023-01-01 RX ORDER — LIDOCAINE HCL 20 MG/ML
10 VIAL (ML) INJECTION ONCE
Refills: 0 | Status: COMPLETED | OUTPATIENT
Start: 2023-01-01 | End: 2023-01-01

## 2023-01-01 RX ORDER — BUMETANIDE 2 MG/1
2 TABLET ORAL TWICE DAILY
Refills: 0 | Status: DISCONTINUED | COMMUNITY
End: 2023-01-01

## 2023-01-01 RX ORDER — COLD-HOT PACK
50 EACH MISCELLANEOUS DAILY
Refills: 0 | Status: ACTIVE | COMMUNITY

## 2023-01-01 RX ORDER — METOPROLOL SUCCINATE 25 MG/1
25 TABLET, EXTENDED RELEASE ORAL DAILY
Qty: 15 | Refills: 0 | Status: ACTIVE | COMMUNITY

## 2023-01-01 RX ORDER — PATIROMER 8.4 G/1
8.4 POWDER, FOR SUSPENSION ORAL
Refills: 0 | Status: DISCONTINUED | COMMUNITY
End: 2023-01-01

## 2023-01-01 RX ORDER — METHYLPREDNISOLONE 4 MG/1
4 TABLET ORAL
Qty: 1 | Refills: 0 | Status: DISCONTINUED | COMMUNITY
Start: 2023-01-01 | End: 2023-01-01

## 2023-01-01 RX ORDER — LOSARTAN POTASSIUM 25 MG/1
25 TABLET, FILM COATED ORAL
Refills: 0 | Status: ACTIVE | COMMUNITY

## 2023-01-01 RX ORDER — TETANUS TOXOID, REDUCED DIPHTHERIA TOXOID AND ACELLULAR PERTUSSIS VACCINE, ADSORBED 5; 2.5; 8; 8; 2.5 [IU]/.5ML; [IU]/.5ML; UG/.5ML; UG/.5ML; UG/.5ML
0.5 SUSPENSION INTRAMUSCULAR ONCE
Refills: 0 | Status: COMPLETED | OUTPATIENT
Start: 2023-01-01 | End: 2023-01-01

## 2023-01-01 RX ORDER — ACETAMINOPHEN 500 MG
1000 TABLET ORAL ONCE
Refills: 0 | Status: DISCONTINUED | OUTPATIENT
Start: 2023-01-01 | End: 2023-01-01

## 2023-01-01 RX ORDER — SPIRONOLACTONE 25 MG/1
25 TABLET ORAL
Qty: 45 | Refills: 0 | Status: DISCONTINUED | COMMUNITY
End: 2023-01-01

## 2023-01-01 RX ORDER — MULTIVIT-MIN/FOLIC/VIT K/LYCOP 400-300MCG
1000 TABLET ORAL DAILY
Refills: 0 | Status: ACTIVE | COMMUNITY

## 2023-01-01 RX ORDER — MILRINONE LACTATE 200 UG/ML
200-5 INJECTION, SOLUTION INTRAVENOUS
Refills: 0 | Status: ACTIVE | COMMUNITY

## 2023-01-01 RX ORDER — AMIODARONE HYDROCHLORIDE 200 MG/1
200 TABLET ORAL DAILY
Refills: 0 | Status: ACTIVE | COMMUNITY

## 2023-01-01 RX ORDER — TORSEMIDE 20 MG/1
20 TABLET ORAL TWICE DAILY
Refills: 0 | Status: DISCONTINUED | COMMUNITY
End: 2023-01-01

## 2023-01-01 RX ADMIN — Medication 975 MILLIGRAM(S): at 17:36

## 2023-01-01 RX ADMIN — TETANUS TOXOID, REDUCED DIPHTHERIA TOXOID AND ACELLULAR PERTUSSIS VACCINE, ADSORBED 0.5 MILLILITER(S): 5; 2.5; 8; 8; 2.5 SUSPENSION INTRAMUSCULAR at 18:29

## 2023-01-01 RX ADMIN — Medication 10 MILLILITER(S): at 18:27

## 2023-01-16 PROBLEM — R53.83 FATIGUE: Status: ACTIVE | Noted: 2019-08-22

## 2023-01-16 NOTE — REVIEW OF SYSTEMS
[Fatigue] : fatigue [Cough] : cough [Negative] : Cardiovascular [Fever] : no fever [Chills] : no chills [Shortness Of Breath] : no shortness of breath [Wheezing] : no wheezing [FreeTextEntry4] : SEE HPI

## 2023-01-16 NOTE — HISTORY OF PRESENT ILLNESS
[Home] : at home, [unfilled] , at the time of the visit. [Medical Office: ([facility])___] : at the medical office located in  [Verbal consent obtained from patient] : the patient, [unfilled] [de-identified] : start time: 12:11\par end time: 12:18\par \par MR. BOWEN IS A PLEASANT 81 YO FOR FOLLOW-UP OF TEST RESULTS.  TESTED POSITIVE FOR COVID.  WIFE ALSO SICK.  HE STATES THAT COUGH SEEMS TO BE GOING AWAY.  IS GETTING BETTER.  IS FEELING BETTER.  NO FEVER.  SOME FATIGUE.  NO N/V/D.  DENIES CHEST PAIN OR SHORTNESS OF BREATH.\par ROS: SEE ABOVE\par PE: UNABLE TO PERFORM\par ASSESSMENT: COVID\par PLAN: \par RESULTS REVIEWED\par ISOLATION PER CDC GUIDELINES\par SUPPORTIVE CARE: REST, FLUIDS, STEAM\par MUCINEX\par DISCUSSED VITAMIN C, D, ZINC\par AMBULATION AND BREATHING EXERCISES\par PAXLOVID DISCUSSED BUT NOT INTERESTED DUE TO SYMPTOM ONSET AND IMPROVING\par CALL WITH ANY QUESTIONS, CONCERNS OR CHANGES \par AWARE WHEN TO SEEK EMERGENT CARE

## 2023-01-16 NOTE — HISTORY OF PRESENT ILLNESS
[FreeTextEntry8] : MR. BOWEN IS A PLEASANT 81 YO FOR ACUTE VISIT.  HAS HAD SYMPTOMS FOR THE PAST TWO WEEKS.  HOME COVI TEST WAS NEGATIVE ONE WEEK AGO AND PRIOR TO THAT.  JUST HAS A LINGERING COUGH.  SOME MUCOUS.  NO FEVER.  NO CONGESTION OR POSTNASAL DRIP.  NO SORE THROAT.  NO EAR PAIN.  NO N/V/D.  TIRED.  TOOK MUCINEX.  WAS BETTER FRIDAY FELT PRETTY GOOD AND WENT OUT.  ON SATURDAY SYMPTOMS STARTED AGAIN.  WIFE HAS COVID.

## 2023-01-16 NOTE — PHYSICAL EXAM
[No Acute Distress] : no acute distress [Well Nourished] : well nourished [Well-Appearing] : well-appearing [Normal Sclera/Conjunctiva] : normal sclera/conjunctiva [PERRL] : pupils equal round and reactive to light [Normal Outer Ear/Nose] : the outer ears and nose were normal in appearance [Normal Oropharynx] : the oropharynx was normal [Normal TMs] : both tympanic membranes were normal [No Respiratory Distress] : no respiratory distress  [No Accessory Muscle Use] : no accessory muscle use [Clear to Auscultation] : lungs were clear to auscultation bilaterally [Normal Rate] : normal rate  [Regular Rhythm] : with a regular rhythm [Normal S1, S2] : normal S1 and S2 [Speech Grossly Normal] : speech grossly normal [Memory Grossly Normal] : memory grossly normal [Normal Mood] : the mood was normal

## 2023-01-16 NOTE — PLAN
[FreeTextEntry1] : WILL SWAB FOR FLU, COVID, RSV\par ISOLATION PENDING RESULTS\par MUCINEX\par SUPPORTIVE CARE: REST, FLUIDS, STEAM\par CHEST XRAY IF COUGH PERSISTS/WORSENS\par CALL WITH ANY QUESTIONS, CONCERNS OR CHANGES \par FOLLOW-UP FOR CPE

## 2023-02-26 PROBLEM — M79.89 LEG SWELLING: Status: ACTIVE | Noted: 2023-01-01

## 2023-02-26 PROBLEM — R06.00 DYSPNEA: Status: ACTIVE | Noted: 2023-01-01

## 2023-02-26 NOTE — HISTORY OF PRESENT ILLNESS
[FreeTextEntry1] : F/U CHEST XRAY [de-identified] : MR. BOWEN IS A PLEASANT 81 YO PRESENTING FOR FOLLOW-UP.  I HAD CALLED TO COME IN TODAY TO DISCUSS CHEST XRAY AS UPON CALL HE IS STILL HAVING A COUGH.  WENT FOR CHEST XRAY YESTERDAY.  HAD COVID IN JANUARY.  AT TEB THOUGHT WAS GETTING BETTER AND COUGH WAS GOING AWAY.  BUT PERSISTED.  IT IS KEEPING HIM UP AT NIGHT.  SHORTNESS OF BREATH STARTED 2 - 3 WEEKS AGO.  TOOK MUCINEX.  PREVIOUSLY SAW PULMONOLOGY FOR FIBROSIS.  STARTED ON BREO BUT STOPPED TAKING THEM.  NEVER FOLLOWED UP.  [facility]  SEEN AFTER COVID. WAS NOT HAVING THESE SYMPTOMS AT THE TIME.  HAS HAD SWELLING IN HIS LEGS.  DOES NOT WANT TO GO TO THE ER.  HAS ALBUTEROL INHALER AT HOME BUT HASN'T USED.  ROUTINELY FOLLOWING WITH CARDIOLOGY FOR HISTORY OF CARDIOMYOPATHY AND CHF.

## 2023-02-26 NOTE — PHYSICAL EXAM
[No Acute Distress] : no acute distress [Well Nourished] : well nourished [Well-Appearing] : well-appearing [Normal Sclera/Conjunctiva] : normal sclera/conjunctiva [PERRL] : pupils equal round and reactive to light [Normal Outer Ear/Nose] : the outer ears and nose were normal in appearance [No Respiratory Distress] : no respiratory distress  [No Accessory Muscle Use] : no accessory muscle use [Clear to Auscultation] : lungs were clear to auscultation bilaterally [Normal Rate] : normal rate  [Regular Rhythm] : with a regular rhythm [Normal S1, S2] : normal S1 and S2 [Speech Grossly Normal] : speech grossly normal [Memory Grossly Normal] : memory grossly normal [Normal Mood] : the mood was normal [de-identified] : SWELLING

## 2023-02-26 NOTE — REVIEW OF SYSTEMS
[Lower Ext Edema] : lower extremity edema [Shortness Of Breath] : shortness of breath [Cough] : cough [Negative] : Heme/Lymph [Chest Pain] : no chest pain [Palpitations] : no palpitations [Wheezing] : no wheezing

## 2023-02-26 NOTE — PLAN
[FreeTextEntry1] : ER RECOMMENDED BUT DECLINES TO GO \par AWARE OF CONCERN WITH SYMPTOMS\par STATES WILL SEE CARDIOLOGY TOMORROW.  WANTS TO WAIT TO HAVE EKG WITH DR. ODEN TOMORROW - RECOMMENDED HERE.\par WILL SEND FOR VENOUS DUPLEX FOR SWELLING AS WELL AS CTA CHEST DUE TO SHORTNESS OF BREATH\par RX MEDROL DOSE DONNA\par ALBUTEROL PRN\par PULMONOLOGY CONSULTANT NOTE REVIEWED; OVERDUE FOR FOLLOW-UP\par PRIOR LAB WORK AND IMAGING ALSO REVIEWED\par AGAIN, AWARE OF CONCERN AND RISKS BUT WILL NOT GO TO ER.\par SUPPORT GIVEN\par CALL WITH ANY QUESTIONS, CONCERNS OR CHANGES

## 2023-03-26 PROBLEM — R91.8 LUNG INFILTRATE ON CT: Status: RESOLVED | Noted: 2020-01-16 | Resolved: 2023-01-01

## 2023-03-26 PROBLEM — R05.9 COUGH: Status: ACTIVE | Noted: 2023-01-01

## 2023-03-26 PROBLEM — U07.1 COVID-19 VIRUS INFECTION: Status: RESOLVED | Noted: 2023-01-01 | Resolved: 2023-01-01

## 2023-03-26 PROBLEM — J84.10 FIBROSIS LUNG: Status: ACTIVE | Noted: 2020-01-16

## 2023-03-26 NOTE — PLAN
[FreeTextEntry1] : RE-ADVISED PULMONOLOGY FOLLOW-UP\par CTA CHEST REVIEWED\par WILL SWAB FOR FLU, COVID, RSV\par SUPPORTIVE CARE: REST, STEAM\par RESTART BREO - RINSE MOUTH WITH USE\par FOLLOW-UP POST START FOR RE-EVALUATION\par [facility] - [facility] BR[facility] IN LAB WORK INCLUDING CBC AND HBA1C RESULTS\par FOLLOW-UP ALL SPECIALISTS AS DIRECTED; LAST CARDIOLOGY NOTE REVIEWED - NEED UPDATED CONSULTANT NOTE\par CALL WITH ANY QUESTIONS, CONCERNS OR CHANGES \par AWARE WHEN TO SEEK EMERGENT CARE

## 2023-03-26 NOTE — HISTORY OF PRESENT ILLNESS
[FreeTextEntry1] : F/U COUGH [de-identified] : MR. BOWEN IS A PLEASANT 83 YO PRESENTING FOR FOLLOW-UP OF COUGH.  SAW CARDIOLOGY.  ADVISED ALL "OKAY".  HAD EKG.  STILL NEEDS TO SCHEDULE PULMONOLOGY EVALUATION.  TOOK ZPAK, INHALER AND STEROIDS.  FELT GREAT WITH NO SYMPTOMS FOR A FEW DAYS.  COUGH STARTED AGAIN JUST UNDER A WEEK AGO.  NO FEVER.  HAS HAD NO NASAL CONGESTION.  DENIES SHORTNESS OF BREATH OR CHEST PAIN.  ONLY COUGH.  MUCOUS.  NO MEDICATIONS TAKEN FOR SYMPTOMS.  HAS NOT NEEDED INHALER.

## 2023-06-25 PROBLEM — I42.9 CARDIOMYOPATHY: Status: ACTIVE | Noted: 2023-01-01

## 2023-06-25 NOTE — REVIEW OF SYSTEMS
[Negative] : Gastrointestinal [Wheezing] : no wheezing [Cough] : no cough [FreeTextEntry6] : SEE HPI

## 2023-06-25 NOTE — HISTORY OF PRESENT ILLNESS
[Spouse] : spouse [FreeTextEntry1] : F/U HEART FAILURE [de-identified] : MR. BOWEN IS A PLEASANT 81 YO PRESENTING FOR FOLLOW-UP.  WAS ADMITTED TO [facility] UNTIL 5/31/23.  WAS ADMITTED FOR SIX DAYS DUE TO HEART FAILURE.  CARDIOLOGY: DR. IBARRA; SAW LAST FRIDAY.  HAS A VISITING NURSE TO THE HOME TWICE A WEEK - DID LAB WORK LAST FRIDAY.  NOW ON MILRINONE DRIP WHICH HE STATES IS "HELPING A LOT".  VERY MINIMAL SOB COMPARED TO PRIOR TO ADMISSION.  IS MONITORING WEIGHT AT HOME.  BLOOD PRESSURE AT HOME 100'S/ 80'S.  ALSO HISTORY OF CAD, HYPERTENSION AND HYPERLIPIDEMIA ON SIMVASTATIN.

## 2023-06-25 NOTE — PLAN
[FreeTextEntry1] : NEED CARDIOLOGY CONSULTANT NOTES FOR REVIEW\par MONITOR WEIGHT DAILY AND FOR EDEMA\par HAS VISITING NURSE TO THE HOME\par WATCH SODIUM INTAKE AND MONITOR BLOOD PRESSURE\par GOOD SUPPORT SYSTEM\par HAVING LAB WORK MONITORED WITH CARDIOLOGY\par CALL WITH ANY QUESTIONS, CONCERNS OR CHANGES \par AWARE WHEN TO SEEK EMERGENT CARE

## 2023-06-25 NOTE — HEALTH RISK ASSESSMENT
[1] : 2) Feeling down, depressed, or hopeless for several days (1) [PHQ-2 Positive] : PHQ-2 Positive [Several Days (1)] : 4.) Feeling tired or having little energy? Several days [Not at All (0)] : 8.) Moving or speaking so slowly that other people could have noticed, or the opposite, moving or speaking faster than usual? Not at all [Not at all] : How difficult have these problems made it for you to do your work, take care of things at home, or get along with people? Not at all [PHQ-9 Negative - No further assessment needed] : PHQ-9 Negative - No further assessment needed [[id number]] : 2 [[id number]] : 4 [Former] : Former [> 15 Years] : > 15 Years

## 2023-06-25 NOTE — PHYSICAL EXAM
[No Acute Distress] : no acute distress [Well Nourished] : well nourished [Well-Appearing] : well-appearing [Normal Sclera/Conjunctiva] : normal sclera/conjunctiva [PERRL] : pupils equal round and reactive to light [No Respiratory Distress] : no respiratory distress  [No Accessory Muscle Use] : no accessory muscle use [Clear to Auscultation] : lungs were clear to auscultation bilaterally [Normal Rate] : normal rate  [Regular Rhythm] : with a regular rhythm [No Joint Swelling] : no joint swelling [Speech Grossly Normal] : speech grossly normal [Normal Mood] : the mood was normal

## 2023-07-25 PROBLEM — I50.9 CHF (CONGESTIVE HEART FAILURE): Status: ACTIVE | Noted: 2019-07-09

## 2023-07-25 PROBLEM — I25.10 CAD (CORONARY ARTERY DISEASE): Status: ACTIVE | Noted: 2023-01-01

## 2023-07-25 NOTE — PLAN
[FreeTextEntry1] : CARDIOLOGY CONSULTANT NOTE APPRECIATED\par NEED OPHTHALMOLOGY NOTE FROM DR. DAILEY\par PULMONOLOGY FOLLOW-UP\par MONITOR WEIGHT AND FOR EDEMA\par TO ER WITH ANY CONCERNS OR SOB\par PRIOR LAB WORK REVIEWED; WILL GET UPDATED LAB WORK INCLUDING A1C\par HEALTHY DIET AND LIFESTYLE MODIFICATIONS\par CONTINUE JANUMET 50 - 1000 BID WITH MEALS PENDING REPEAT LAB WORK\par CALL WITH ANY QUESTIONS, CONCERNS OR CHANGES

## 2023-07-25 NOTE — HISTORY OF PRESENT ILLNESS
[FreeTextEntry1] : F/U CHF [de-identified] : MR. BOWEN IS A PLEASANT 83 YO PRESENTING FOR FOLLOW-UP. WAS ADMITTED TO [facility] UNTIL 5/31/23. WAS ADMITTED FOR SIX DAYS DUE TO HEART FAILURE. CARDIOLOGY ROUTINELY FOLLOWING WITH.  HAS CHF SPECIALIST.  ALSO HISTORY OF HYPERTENSION, HYPERLIPIDEMIA AND CAD.  STARTED ON DIABETES MEDICATIONS WHILE IN HOSPITAL.  STATES THAT HIS BREATHING HAS BEEN "GOOD".  NO SWELLING.  DENIES CHEST PAIN.  FS'S 101 - 148.  HAS YEARLY EYE EXAM WITH DR. DAILEY.  TAKING MEDICATIONS AS DIRECTED.

## 2023-12-09 NOTE — ED ADULT NURSE NOTE - NSICDXPASTSURGICALHX_GEN_ALL_CORE_FT
PAST SURGICAL HISTORY:  AICD (automatic cardioverter/defibrillator) present MDT -2013-[facility]    S/P ablation of atrial fibrillation [place]    S/P hernia repair bilateral

## 2023-12-09 NOTE — ED PROVIDER NOTE - PATIENT PORTAL LINK FT
You can access the FollowMyHealth Patient Portal offered by [facility] by registering at the following website: [email or web address]. By joining [employer]’s FollowMyHealth portal, you will also be able to view your health information using other applications (apps) compatible with our system. You can access the FollowMyHealth Patient Portal offered by [facility] by registering at the following website: [email or web address]. By joining [facility]’s FollowMyHealth portal, you will also be able to view your health information using other applications (apps) compatible with our system.

## 2023-12-09 NOTE — ED PROVIDER NOTE - NS ED ROS FT
Gen: No fever, no change in activity level  HEAD: left eyebrow abrasion, upper lip laceration  ENT: No congestion, no rhinorrhea  Resp: No cough, no trouble breathing  Cardiovascular: No chest pain, no palpitation, + b/l leg edema   Gastrointestinal: No nausea, no vomiting, no diarrhea  :  No change in urine output; no dysuria, no hematuria  MS: No joint or muscle pain  Skin: No rashes.   Neuro: No headache; no abnormal movements  Remainder negative, except as per the HPI

## 2023-12-09 NOTE — PROCEDURE NOTE - NSLAC1LOCATION_SKIN_A_CORE
Carlos Garcia  INTERNAL MEDICINE  [address] [place] [address] [address] [place] [address], [place] [id number]  Phone: [phone]  Fax: [phone]  Established Patient  Follow Up Time:
lip

## 2023-12-09 NOTE — ED ADULT NURSE NOTE - OBJECTIVE STATEMENT
patient presents to ED s/p fall at home. patient denies sob, light headiness, dizziness prior to fall. patient reports he tripped on the side walk.  Patient reports head trauma, denies loc, denies blood thinning medication  lac noted to patient left forehead and lip  abrasion noted to palms  patient reports pain to right wrist

## 2023-12-09 NOTE — ED PROVIDER NOTE - NSFOLLOWUPINSTRUCTIONS_ED_ALL_ED_FT
1. Sutures are absorbable, please don't soak them in liquid  2. Pain medication over the counter  3. Follow up with your primary doctor    Laceration Care, Adult    A laceration is a cut that may go through all layers of the skin and into the tissue that is right under the skin. Some lacerations heal on their own. Others need to be closed with stitches (sutures), staples, skin adhesive strips, or skin glue. Proper care of a laceration reduces the risk for infection, helps the laceration heal better, and may prevent scarring.    How to care for your laceration  Wash your hands with soap and water before touching your wound or changing your bandage (dressing). If soap and water are not available, use hand .    Keep the wound clean and dry.    If you were given a dressing, you should change it at least once a day, or as told by your health care provider. You should also change it if it becomes wet or dirty.        If sutures or staples were used:    Keep the wound completely dry for the first 24 hours, or as told by your health care provider. After that time, you may shower or bathe. However, make sure that the wound is not soaked in water until after the sutures or staples have been removed.  Clean the wound once each day, or as told by your health care provider:    Wash the wound with soap and water.  Rinse the wound with water to remove all soap.  Pat the wound dry with a clean towel. Do not rub the wound.  After cleaning the wound, apply a thin layer of antibiotic ointment as told by your health care provider. This will help prevent infection and keep the dressing from sticking to the wound.  Have the sutures or staples removed as told by your health care provider.        If skin adhesive strips were used:    Do not get the skin adhesive strips wet. You may shower or bathe, but be careful to keep the wound dry.  If the wound gets wet, pat it dry with a clean towel. Do not rub the wound.  Skin adhesive strips fall off on their own. You may trim the strips as the wound heals. Do not remove skin adhesive strips that are still stuck to the wound. They will fall off in time.        If skin glue was used:    Try to keep the wound dry, but you may briefly wet it in the shower or bath. Do not soak the wound in water, such as by swimming.  After you have showered or bathed, gently pat the wound dry with a clean towel. Do not rub the wound.  Do not do any activities that will make you sweat heavily until the skin glue has fallen off on its own.  Do not apply liquid, cream, or ointment medicine to the wound while the skin glue is in place. Using those may loosen the film before the wound has healed.  If a dressing is placed over the wound, be careful not to apply tape directly over the skin glue. Doing that may cause the glue to be pulled off before the wound has healed.  Do not pick at the glue. Skin glue usually remains in place for 5–10 days and then falls off the skin.        General instructions     Take over-the-counter and prescription medicines only as told by your health care provider.  If you were prescribed an antibiotic medicine or ointment, take or apply it as told by your health care provider. Do not stop using it even if your condition improves.  Do not scratch or pick at the wound.  Check your wound every day for signs of infection. Watch for:    Redness, swelling, or pain.  Fluid, blood, or pus.  Raise (elevate) the injured area above the level of your heart while you are sitting or lying down for the first 24–48 hours after the laceration is repaired.  If directed, put ice on the affected area:    Put ice in a plastic bag.  Place a towel between your skin and the bag.  Leave the ice on for 20 minutes, 2–3 times a day.  Keep all follow-up visits as told by your health care provider. This is important.    Contact a health care provider if:  You received a tetanus shot and you have swelling, severe pain, redness, or bleeding at the injection site.  You have a fever.  A wound that was closed breaks open.  You notice a bad smell coming from your wound or your dressing.  You notice something coming out of the wound, such as wood or glass.  Your pain is not controlled with medicine.  You have increased redness, swelling, or pain at the site of your wound.  You have fluid, blood, or pus coming from your wound.  You need to change the dressing often due to fluid, blood, or pus that is draining from the wound.  You develop a new rash.  You develop numbness around the wound.    Get help right away if:  You develop severe swelling around the wound.  Your pain suddenly increases and is severe.  You develop painful lumps near the wound or on skin anywhere else on your body.  You have a red streak going away from your wound.  The wound is on your hand or foot and you cannot properly move a finger or toe.  The wound is on your hand or foot, and you notice that your fingers or toes look pale or bluish.    Summary  A laceration is a cut that may go through all layers of the skin and into the tissue that is right under the skin.  Some lacerations heal on their own. Others need to be closed with stitches (sutures), staples, skin adhesive strips, or skin glue.  Proper care of a laceration reduces the risk of infection, helps the laceration heal better, and prevents scarring.    ADDITIONAL NOTES AND INSTRUCTIONS    Please follow up with your Primary MD in 24-48 hr.  Seek immediate medical care for any new/worsening signs or symptoms.       Understanding Your Risk for Falls    Each year, millions of people have serious injuries from falls. It is important to understand your risk for falling. Talk with your health care provider about your risk and what you can do to lower it. There are actions you can take at home to lower your risk.    If you do have a serious fall, make sure you tell your health care provider. Falling once raises your risk for falling again.    How can falls affect me?  Serious injuries from falls are common. These include:    Broken bones, such as hip fractures.  Head injuries, such as traumatic brain injuries (TBI).    Fear of falling can also cause you to avoid activities and stay at home. This can make your muscles weaker and actually raise your risk for a fall.    What can increase my risk?  There are a number of risk factors that increase your risk for falling. The more risk factors you have, the higher your risk for falling. Serious injuries from a fall most often happen to people older than age 65. Children and young adults ages 15–29 are also at higher risk.    Common risk factors include:    Weakness in the lower body.  Lack (deficiency) of vitamin D.  Being generally weak or confused due to long-term (chronic) illness.  Dizziness or balance problems.  Poor vision.  Medicines that cause dizziness or drowsiness. These can include medicines for your blood pressure, heart, anxiety, insomnia, or edema, as well as pain medicines and muscle relaxants.    Other risk factors include:    Drinking alcohol.  Having had a fall in the past.  Having depression.  Foot pain or improper footwear.  Working at a dangerous job.  Having any of the following in your home:    Tripping hazards, such as floor clutter or loose rugs.  Poor lighting.  Pets or clutter.  Dementia or memory loss.    What actions can I take to lower my risk of falling?          Physical activity    Maintain physical fitness. Do strength and balance exercises. Consider taking a regular class to build strength and balance. Yoga and josué chi are good options.        Vision    Have your eyes checked every year and your vision prescription updated as needed.        Walking aids and footwear    Wear nonskid shoes. Do not wear high heels.  Do not walk around the house in socks or slippers.  Use a cane or walker as told by your health care provider.        Home safety    Attach secure railings on both sides of your stairs.  Install grab bars for your tub, shower, and toilet. Use a bath mat in your tub or shower.  Use good lighting in all rooms. Keep a flashlight near your bed.  Make sure there is a clear path from your bed to the bathroom. Use night-lights.  Do not use throw rugs. Make sure all carpeting is taped or tacked down securely.  Remove all clutter from walkways and stairways, including extension cords.  Repair uneven or broken steps.  Avoid walking on icy or slippery surfaces. Walk on the grass instead of on icy or slick sidewalks. Where you can, use ice melt to get rid of ice on walkways.  Use a cordless phone.    Questions to ask your health care provider  Can you help me check my risk for a fall?  Do any of my medicines make me more likely to fall?  Should I take a vitamin D supplement?  What exercises can I do to improve my strength and balance?  Should I make an appointment to have my vision checked?  Do I need a bone density test to check for weak bones or osteoporosis?  Would it help to use a cane or a walker?    Where to find more information  Centers for Disease Control and PreventionAYLIN: www.cdc.gov  Community-Based Fall Prevention Programs: www.cdc.gov  National [place] on Aging: lv3ahcb.[place].nih.gov    Contact a health care provider if:  You fall at home.  You are afraid of falling at home.  You feel weak, drowsy, or dizzy.    Summary  People 65 and older are at high risk for falling. However, older people are not the only ones injured in falls. Children and young adults have a higher-than-normal risk too.  Talk with your health care provider about your risks for falling and how to lower those risks.  Taking certain precautions at home can lower your risk for falling.  If you fall, always tell your health care provider.    ADDITIONAL NOTES AND INSTRUCTIONS    Please follow up with your Primary MD in 24-48 hr.  Seek immediate medical care for any new/worsening signs or symptoms. 1. Sutures are absorbable, please don't soak them in liquid  2. Pain medication over the counter  3. Follow up with your primary doctor    Laceration Care, Adult    A laceration is a cut that may go through all layers of the skin and into the tissue that is right under the skin. Some lacerations heal on their own. Others need to be closed with stitches (sutures), staples, skin adhesive strips, or skin glue. Proper care of a laceration reduces the risk for infection, helps the laceration heal better, and may prevent scarring.    How to care for your laceration  Wash your hands with soap and water before touching your wound or changing your bandage (dressing). If soap and water are not available, use hand .    Keep the wound clean and dry.    If you were given a dressing, you should change it at least once a day, or as told by your health care provider. You should also change it if it becomes wet or dirty.        If sutures or staples were used:    Keep the wound completely dry for the first 24 hours, or as told by your health care provider. After that time, you may shower or bathe. However, make sure that the wound is not soaked in water until after the sutures or staples have been removed.  Clean the wound once each day, or as told by your health care provider:    Wash the wound with soap and water.  Rinse the wound with water to remove all soap.  Pat the wound dry with a clean towel. Do not rub the wound.  After cleaning the wound, apply a thin layer of antibiotic ointment as told by your health care provider. This will help prevent infection and keep the dressing from sticking to the wound.  Have the sutures or staples removed as told by your health care provider.        If skin adhesive strips were used:    Do not get the skin adhesive strips wet. You may shower or bathe, but be careful to keep the wound dry.  If the wound gets wet, pat it dry with a clean towel. Do not rub the wound.  Skin adhesive strips fall off on their own. You may trim the strips as the wound heals. Do not remove skin adhesive strips that are still stuck to the wound. They will fall off in time.        If skin glue was used:    Try to keep the wound dry, but you may briefly wet it in the shower or bath. Do not soak the wound in water, such as by swimming.  After you have showered or bathed, gently pat the wound dry with a clean towel. Do not rub the wound.  Do not do any activities that will make you sweat heavily until the skin glue has fallen off on its own.  Do not apply liquid, cream, or ointment medicine to the wound while the skin glue is in place. Using those may loosen the film before the wound has healed.  If a dressing is placed over the wound, be careful not to apply tape directly over the skin glue. Doing that may cause the glue to be pulled off before the wound has healed.  Do not pick at the glue. Skin glue usually remains in place for 5–10 days and then falls off the skin.        General instructions     Take over-the-counter and prescription medicines only as told by your health care provider.  If you were prescribed an antibiotic medicine or ointment, take or apply it as told by your health care provider. Do not stop using it even if your condition improves.  Do not scratch or pick at the wound.  Check your wound every day for signs of infection. Watch for:    Redness, swelling, or pain.  Fluid, blood, or pus.  Raise (elevate) the injured area above the level of your heart while you are sitting or lying down for the first 24–48 hours after the laceration is repaired.  If directed, put ice on the affected area:    Put ice in a plastic bag.  Place a towel between your skin and the bag.  Leave the ice on for 20 minutes, 2–3 times a day.  Keep all follow-up visits as told by your health care provider. This is important.    Contact a health care provider if:  You received a tetanus shot and you have swelling, severe pain, redness, or bleeding at the injection site.  You have a fever.  A wound that was closed breaks open.  You notice a bad smell coming from your wound or your dressing.  You notice something coming out of the wound, such as wood or glass.  Your pain is not controlled with medicine.  You have increased redness, swelling, or pain at the site of your wound.  You have fluid, blood, or pus coming from your wound.  You need to change the dressing often due to fluid, blood, or pus that is draining from the wound.  You develop a new rash.  You develop numbness around the wound.    Get help right away if:  You develop severe swelling around the wound.  Your pain suddenly increases and is severe.  You develop painful lumps near the wound or on skin anywhere else on your body.  You have a red streak going away from your wound.  The wound is on your hand or foot and you cannot properly move a finger or toe.  The wound is on your hand or foot, and you notice that your fingers or toes look pale or bluish.    Summary  A laceration is a cut that may go through all layers of the skin and into the tissue that is right under the skin.  Some lacerations heal on their own. Others need to be closed with stitches (sutures), staples, skin adhesive strips, or skin glue.  Proper care of a laceration reduces the risk of infection, helps the laceration heal better, and prevents scarring.    ADDITIONAL NOTES AND INSTRUCTIONS    Please follow up with your Primary MD in 24-48 hr.  Seek immediate medical care for any new/worsening signs or symptoms.       Understanding Your Risk for Falls    Each year, millions of people have serious injuries from falls. It is important to understand your risk for falling. Talk with your health care provider about your risk and what you can do to lower it. There are actions you can take at home to lower your risk.    If you do have a serious fall, make sure you tell your health care provider. Falling once raises your risk for falling again.    How can falls affect me?  Serious injuries from falls are common. These include:    Broken bones, such as hip fractures.  Head injuries, such as traumatic brain injuries (TBI).    Fear of falling can also cause you to avoid activities and stay at home. This can make your muscles weaker and actually raise your risk for a fall.    What can increase my risk?  There are a number of risk factors that increase your risk for falling. The more risk factors you have, the higher your risk for falling. Serious injuries from a fall most often happen to people older than age 65. Children and young adults ages 15–29 are also at higher risk.    Common risk factors include:    Weakness in the lower body.  Lack (deficiency) of vitamin D.  Being generally weak or confused due to long-term (chronic) illness.  Dizziness or balance problems.  Poor vision.  Medicines that cause dizziness or drowsiness. These can include medicines for your blood pressure, heart, anxiety, insomnia, or edema, as well as pain medicines and muscle relaxants.    Other risk factors include:    Drinking alcohol.  Having had a fall in the past.  Having depression.  Foot pain or improper footwear.  Working at a dangerous job.  Having any of the following in your home:    Tripping hazards, such as floor clutter or loose rugs.  Poor lighting.  Pets or clutter.  Dementia or memory loss.    What actions can I take to lower my risk of falling?          Physical activity    Maintain physical fitness. Do strength and balance exercises. Consider taking a regular class to build strength and balance. Yoga and josué chi are good options.        Vision    Have your eyes checked every year and your vision prescription updated as needed.        Walking aids and footwear    Wear nonskid shoes. Do not wear high heels.  Do not walk around the house in socks or slippers.  Use a cane or walker as told by your health care provider.        Home safety    Attach secure railings on both sides of your stairs.  Install grab bars for your tub, shower, and toilet. Use a bath mat in your tub or shower.  Use good lighting in all rooms. Keep a flashlight near your bed.  Make sure there is a clear path from your bed to the bathroom. Use night-lights.  Do not use throw rugs. Make sure all carpeting is taped or tacked down securely.  Remove all clutter from walkways and stairways, including extension cords.  Repair uneven or broken steps.  Avoid walking on icy or slippery surfaces. Walk on the grass instead of on icy or slick sidewalks. Where you can, use ice melt to get rid of ice on walkways.  Use a cordless phone.    Questions to ask your health care provider  Can you help me check my risk for a fall?  Do any of my medicines make me more likely to fall?  Should I take a vitamin D supplement?  What exercises can I do to improve my strength and balance?  Should I make an appointment to have my vision checked?  Do I need a bone density test to check for weak bones or osteoporosis?  Would it help to use a cane or a walker?    Where to find more information  Centers for Disease Control and PreventionAYLIN: www.cdc.gov  Community-Based Fall Prevention Programs: www.cdc.gov  National [place] on Aging: pn3vnwo.[place].nih.gov    Contact a health care provider if:  You fall at home.  You are afraid of falling at home.  You feel weak, drowsy, or dizzy.    Summary  People 65 and older are at high risk for falling. However, older people are not the only ones injured in falls. Children and young adults have a higher-than-normal risk too.  Talk with your health care provider about your risks for falling and how to lower those risks.  Taking certain precautions at home can lower your risk for falling.  If you fall, always tell your health care provider.    ADDITIONAL NOTES AND INSTRUCTIONS    Please follow up with your Primary MD in 24-48 hr.  Seek immediate medical care for any new/worsening signs or symptoms.

## 2023-12-09 NOTE — ED PROVIDER NOTE - ATTENDING CONTRIBUTION TO CARE
Thierno: I performed a face to face evaluation of this patient and performed a full history and physical examination on the patient.  I agree with the resident's history, physical examination, and plan of the patient unless otherwise noted. My brief assessment is as follows: pmh as documented, present s/p trip and fall from standing. states no loc/no dizziness. no a/c. with some pain to right hand near scaphoid. no other complaints. with abrasion above left eye. lac upper lip by vermillion border. no other facial trauma. mild ttp near snuff box on right hand. no other sign trauma. cth/neck, xray hand. splint lac repair.

## 2023-12-09 NOTE — ED PROVIDER NOTE - CARE PLAN
1 Principal Discharge DX:	Fall   Principal Discharge DX:	Facial laceration  Secondary Diagnosis:	Closed head injury  Secondary Diagnosis:	Right wrist sprain

## 2023-12-09 NOTE — ED ADULT NURSE NOTE - NSICDXPASTMEDICALHX_GEN_ALL_CORE_FT
PAST MEDICAL HISTORY:  Atrial fibrillation s/p ablation/MAZE/LA appendage closure/thrombectomy    CAD (coronary artery disease) 70% diag lesion 2001    Cardiomyopathy viral    Hyperlipidemia     Hypertension

## 2023-12-09 NOTE — ED PROVIDER NOTE - OBJECTIVE STATEMENT
84 y/o male PMH hypothyroidism, HTN, A. Fib on amiodarone, CHF, HDL. Comes to ED s/p mechanical, fall 2 hours ago, landed on his face, left eyebrow abrasion (4cm), upper lip laceration (1 cm), left palm abrasion. Right hand pain and tenderness. Last Tdap > 10 years ago. Neurologically intact, no LOC, on aspirin.

## 2023-12-09 NOTE — ED PROVIDER NOTE - CLINICAL SUMMARY MEDICAL DECISION MAKING FREE TEXT BOX
84 y/o male PMH hypothyroidism, HTN, A. Fib on amiodarone, CHF, HDL. Comes to ED s/p mechanical, fall 2 hours ago, landed on his face, left eyebrow abrasion (4cm), upper lip laceration (1 cm), left palm abrasion. Right hand pain and tenderness. Last Tdap > 10 years ago. Neurologically intact, no LOC, on aspirin.    A/P   1. CT head and neck  2. Clean wounds  3. Suture upper lip  4. Tdap vaccine    Dispo home with OTC pain meds 82 y/o male PMH hypothyroidism, HTN, A. Fib on amiodarone, CHF, HDL. Comes to ED s/p mechanical, fall 2 hours ago, landed on his face, left eyebrow abrasion (4cm), upper lip laceration (1 cm), left palm abrasion. Right hand pain and tenderness. Last Tdap > 10 years ago. Neurologically intact, no LOC, on aspirin.    A/P   1. CT head and neck  2. Clean wounds  3. Suture upper lip  4. Tdap vaccine    Dispo home with OTC pain meds

## 2023-12-09 NOTE — ED ADULT NURSE REASSESSMENT NOTE - NS ED NURSE REASSESS COMMENT FT1
assumed pt care at 19:30, received report from Rigo RICH , no apparent distress noted at this time, charting as noted. pt received a&ox4 resting in bed comfortably, rr even and unlabored on room air . pt updated on plan of care, awaiting CT results.

## 2023-12-09 NOTE — ED ADULT TRIAGE NOTE - CHIEF COMPLAINT QUOTE
" I was standing and I tripped causing me to fall on my face" pt denies LOC, blood thinners, has abrasion to left forehead, mouth, left hand

## 2023-12-09 NOTE — ED PROVIDER NOTE - CARE PROVIDER_API CALL
Marty Zarco  Orthopaedic Surgery  [address]  [place] [address]  Phone: [phone]  Fax: [phone]  Follow Up Time:    Marty Zarco  Orthopaedic Surgery  [address]  [place] [address]  Phone: [phone]  Fax: [phone]  Follow Up Time:

## 2023-12-17 PROBLEM — E07.9 THYROID DISORDER: Status: ACTIVE | Noted: 2023-01-01

## 2023-12-17 PROBLEM — E11.9 DIABETES MELLITUS: Status: ACTIVE | Noted: 2019-08-22

## 2023-12-17 PROBLEM — R74.8 ALKALINE PHOSPHATASE ELEVATION: Status: ACTIVE | Noted: 2023-01-01
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
## 2023-12-17 PROBLEM — Z23 FLU VACCINE NEED: Status: ACTIVE | Noted: 2023-01-01

## 2023-12-17 PROBLEM — M79.645 PAIN OF LEFT THUMB: Status: ACTIVE | Noted: 2023-01-01

## 2023-12-17 NOTE — PLAN
[FreeTextEntry1] : WILL CHECK XRAY HAND, WRIST AND FINGERS ORTHOPEDIC HAND REFERRAL FALL PRECAUTIONS - ACCIDENTAL FALL BACK TO ER WITH ANY CONCERNS CALL WITH ANY QUESTIONS, CONCERNS OR CHANGES

## 2023-12-17 NOTE — HISTORY OF PRESENT ILLNESS
[FreeTextEntry1] : FOLLOW-UP ER [de-identified] : MR. BOWEN IS A PLEASANT 84 YO PRESENTING FOR FOLLOW-UP.  ON FRIDAY WENT TO [facility] FOR CHEST PAIN.  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  TO FOLLOW-UP WITH CARDIOLOGY.  THEN ON SATURDAY WHILE AT HOME HE MISTEPPED OFF THE GRASS ONTO THE PAVEMENT.  FELT FINE PRIOR.  WAS TAKEN BY AMBULANCE TO [facility].  HAD XRAY OF HIS RIGHT HAND AND WRIST.  ALSO CT HEAD AND C-SPINE.  A STITCH WAS PLACED ON LIP - DISOLVABLE.  LEFT HAND THUMB IS BOTHERING HIM.  STILL RIGHT WRIST AND HAND WHICH WERE IMAGED.  HAS HAD NO NECK PAIN AND NO HEADACHE OR DIZZINESS.

## 2023-12-17 NOTE — PLAN
[FreeTextEntry1] : FLU VACCINE GIVEN AND TOLERATED WELL TO CALL BACK WITH MEDICATION LIST AND CONFIRM IF STILL ON AMIODARONE DISCUSSED NEED FOR MONITORING OF TFT'S, PFT'S AND OPHTHALMOLOGY EXAMINATIONS BENEFIT OUTWEIGHTS RISKS OF AMIODARONE AND THYROID AT THIS TIME ROUTINELY FOLLOWING WITH CARDIOLOGY WILL RECHECK TFT'S WILL ALSO CHECK ADDITIONAL LAB WORK FOR ALK PHOS CONTINUE SIMVASTATIN 40 MG QHS CALL WITH ANY QUESTIONS, CONCERNS OR CHANGES

## 2023-12-17 NOTE — HISTORY OF PRESENT ILLNESS
[FreeTextEntry1] : CHECK-UP [de-identified] : MR. BOWEN IS A PLEASANT 82 YO PRESENTING FOR FOLLOW-UP WITH HIS WIFE TODAY.  HAS BEEN ON THYROID MEDICATION FOR 1 1/2 MONTHS.  HAD [facility]SL[facility] BUT WASN'T TAKING IT.  HISTORY OF HYPERTENSION, HYPERLIPIDEMIA AND CAD AS WELL AS DIABETES AND CHF.  FOLLOWS WITH MULTIPLE CARDIOLOGISTS INCLUDING CHF SPECIALIST.  HSA BEEN DOING WELL ON CURRENT MEDICATION REGIMEN.  UNCLEAR IF STILL ON AMIODARONE.

## 2023-12-17 NOTE — PHYSICAL EXAM
[No Acute Distress] : no acute distress [Well Nourished] : well nourished [Normal Sclera/Conjunctiva] : normal sclera/conjunctiva [PERRL] : pupils equal round and reactive to light [No Respiratory Distress] : no respiratory distress  [No Accessory Muscle Use] : no accessory muscle use [Clear to Auscultation] : lungs were clear to auscultation bilaterally [Normal Rate] : normal rate  [Regular Rhythm] : with a regular rhythm [Normal S1, S2] : normal S1 and S2 [Coordination Grossly Intact] : coordination grossly intact [No Focal Deficits] : no focal deficits [de-identified] : GOOD ROM WRIST AND HAND, RADIAL PULSE INTACT [de-identified] : ECCHYMOSES TO FACE AND SUTURE LIP

## 2023-12-25 NOTE — ED PROCEDURE NOTE - CPROC ED TIME OUT STATEMENT1
“Patient's name, , procedure and correct site were confirmed during the [facility] Timeout.” “Patient's name, , procedure and correct site were confirmed during the [facility] Timeout.”

## 2024-09-09 NOTE — REVIEW OF SYSTEMS
Provider at bedside.     Miracle Fraser RN  09/09/24 0006     [Negative] : Neurological [Fever] : no fever [Chills] : no chills

## 2025-05-08 NOTE — ED PROVIDER NOTE - WR ORDER NAME 1
Please follow up with your primary care regarding your ER visit today. Can continue tylenol at home for headaches and shoulder/neck pain. Return to the ER if symptoms worsen.   
Xray Hand 3 Views, Right